# Patient Record
Sex: MALE | Race: WHITE | NOT HISPANIC OR LATINO | Employment: FULL TIME | URBAN - METROPOLITAN AREA
[De-identification: names, ages, dates, MRNs, and addresses within clinical notes are randomized per-mention and may not be internally consistent; named-entity substitution may affect disease eponyms.]

---
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
## 2019-01-07 ENCOUNTER — COMMUNICATION - HEALTHEAST (OUTPATIENT)
Dept: PEDIATRICS | Facility: CLINIC | Age: 19
End: 2019-01-07

## 2019-01-07 DIAGNOSIS — F90.2 ATTENTION DEFICIT HYPERACTIVITY DISORDER (ADHD), COMBINED TYPE: ICD-10-CM

## 2019-01-10 ENCOUNTER — OFFICE VISIT - HEALTHEAST (OUTPATIENT)
Dept: PEDIATRICS | Facility: CLINIC | Age: 19
End: 2019-01-10

## 2019-01-10 DIAGNOSIS — F90.2 ATTENTION DEFICIT HYPERACTIVITY DISORDER (ADHD), COMBINED TYPE: ICD-10-CM

## 2019-02-14 ENCOUNTER — COMMUNICATION - HEALTHEAST (OUTPATIENT)
Dept: PEDIATRICS | Facility: CLINIC | Age: 19
End: 2019-02-14

## 2019-02-14 DIAGNOSIS — F90.2 ATTENTION DEFICIT HYPERACTIVITY DISORDER (ADHD), COMBINED TYPE: ICD-10-CM

## 2019-02-16 ENCOUNTER — COMMUNICATION - HEALTHEAST (OUTPATIENT)
Dept: SCHEDULING | Facility: CLINIC | Age: 19
End: 2019-02-16

## 2019-02-16 ENCOUNTER — COMMUNICATION - HEALTHEAST (OUTPATIENT)
Dept: PEDIATRICS | Facility: CLINIC | Age: 19
End: 2019-02-16

## 2019-02-16 DIAGNOSIS — F90.2 ATTENTION DEFICIT HYPERACTIVITY DISORDER (ADHD), COMBINED TYPE: ICD-10-CM

## 2019-03-11 ENCOUNTER — COMMUNICATION - HEALTHEAST (OUTPATIENT)
Dept: PEDIATRICS | Facility: CLINIC | Age: 19
End: 2019-03-11

## 2019-03-11 DIAGNOSIS — F90.2 ATTENTION DEFICIT HYPERACTIVITY DISORDER (ADHD), COMBINED TYPE: ICD-10-CM

## 2019-03-17 ENCOUNTER — COMMUNICATION - HEALTHEAST (OUTPATIENT)
Dept: PEDIATRICS | Facility: CLINIC | Age: 19
End: 2019-03-17

## 2019-03-17 DIAGNOSIS — F90.2 ATTENTION DEFICIT HYPERACTIVITY DISORDER (ADHD), COMBINED TYPE: ICD-10-CM

## 2019-04-22 ENCOUNTER — COMMUNICATION - HEALTHEAST (OUTPATIENT)
Dept: PEDIATRICS | Facility: CLINIC | Age: 19
End: 2019-04-22

## 2019-04-22 DIAGNOSIS — F90.2 ATTENTION DEFICIT HYPERACTIVITY DISORDER (ADHD), COMBINED TYPE: ICD-10-CM

## 2019-05-17 ENCOUNTER — COMMUNICATION - HEALTHEAST (OUTPATIENT)
Dept: PEDIATRICS | Facility: CLINIC | Age: 19
End: 2019-05-17

## 2019-05-17 DIAGNOSIS — F90.2 ATTENTION DEFICIT HYPERACTIVITY DISORDER (ADHD), COMBINED TYPE: ICD-10-CM

## 2019-06-17 ENCOUNTER — COMMUNICATION - HEALTHEAST (OUTPATIENT)
Dept: PEDIATRICS | Facility: CLINIC | Age: 19
End: 2019-06-17

## 2019-06-17 DIAGNOSIS — F90.2 ATTENTION DEFICIT HYPERACTIVITY DISORDER (ADHD), COMBINED TYPE: ICD-10-CM

## 2019-08-12 ENCOUNTER — COMMUNICATION - HEALTHEAST (OUTPATIENT)
Dept: PEDIATRICS | Facility: CLINIC | Age: 19
End: 2019-08-12

## 2019-08-12 DIAGNOSIS — F90.2 ATTENTION DEFICIT HYPERACTIVITY DISORDER (ADHD), COMBINED TYPE: ICD-10-CM

## 2019-08-16 ENCOUNTER — COMMUNICATION - HEALTHEAST (OUTPATIENT)
Dept: PEDIATRICS | Facility: CLINIC | Age: 19
End: 2019-08-16

## 2019-09-03 ENCOUNTER — COMMUNICATION - HEALTHEAST (OUTPATIENT)
Dept: PEDIATRICS | Facility: CLINIC | Age: 19
End: 2019-09-03

## 2019-09-03 DIAGNOSIS — F90.2 ATTENTION DEFICIT HYPERACTIVITY DISORDER (ADHD), COMBINED TYPE: ICD-10-CM

## 2019-09-10 ENCOUNTER — COMMUNICATION - HEALTHEAST (OUTPATIENT)
Dept: PEDIATRICS | Facility: CLINIC | Age: 19
End: 2019-09-10

## 2019-09-24 ENCOUNTER — COMMUNICATION - HEALTHEAST (OUTPATIENT)
Dept: PEDIATRICS | Facility: CLINIC | Age: 19
End: 2019-09-24

## 2019-09-24 DIAGNOSIS — F90.2 ATTENTION DEFICIT HYPERACTIVITY DISORDER (ADHD), COMBINED TYPE: ICD-10-CM

## 2019-09-26 ENCOUNTER — AMBULATORY - HEALTHEAST (OUTPATIENT)
Dept: PEDIATRICS | Facility: CLINIC | Age: 19
End: 2019-09-26

## 2019-09-26 ENCOUNTER — COMMUNICATION - HEALTHEAST (OUTPATIENT)
Dept: PEDIATRICS | Facility: CLINIC | Age: 19
End: 2019-09-26

## 2019-09-26 DIAGNOSIS — F90.2 ATTENTION DEFICIT HYPERACTIVITY DISORDER (ADHD), COMBINED TYPE: ICD-10-CM

## 2019-09-30 ENCOUNTER — OFFICE VISIT - HEALTHEAST (OUTPATIENT)
Dept: PEDIATRICS | Facility: CLINIC | Age: 19
End: 2019-09-30

## 2019-09-30 ENCOUNTER — COMMUNICATION - HEALTHEAST (OUTPATIENT)
Dept: TELEHEALTH | Facility: CLINIC | Age: 19
End: 2019-09-30

## 2019-09-30 DIAGNOSIS — F90.2 ATTENTION DEFICIT HYPERACTIVITY DISORDER (ADHD), COMBINED TYPE: ICD-10-CM

## 2019-12-25 ENCOUNTER — COMMUNICATION - HEALTHEAST (OUTPATIENT)
Dept: PEDIATRICS | Facility: CLINIC | Age: 19
End: 2019-12-25

## 2019-12-25 DIAGNOSIS — F90.2 ATTENTION DEFICIT HYPERACTIVITY DISORDER (ADHD), COMBINED TYPE: ICD-10-CM

## 2020-03-12 ENCOUNTER — COMMUNICATION - HEALTHEAST (OUTPATIENT)
Dept: PEDIATRICS | Facility: CLINIC | Age: 20
End: 2020-03-12

## 2020-03-12 DIAGNOSIS — F90.2 ATTENTION DEFICIT HYPERACTIVITY DISORDER (ADHD), COMBINED TYPE: ICD-10-CM

## 2020-04-13 ENCOUNTER — OFFICE VISIT - HEALTHEAST (OUTPATIENT)
Dept: PEDIATRICS | Facility: CLINIC | Age: 20
End: 2020-04-13

## 2020-04-13 DIAGNOSIS — F90.2 ATTENTION DEFICIT HYPERACTIVITY DISORDER (ADHD), COMBINED TYPE: ICD-10-CM

## 2020-05-28 ENCOUNTER — COMMUNICATION - HEALTHEAST (OUTPATIENT)
Dept: FAMILY MEDICINE | Facility: CLINIC | Age: 20
End: 2020-05-28

## 2020-05-28 DIAGNOSIS — F90.2 ATTENTION DEFICIT HYPERACTIVITY DISORDER (ADHD), COMBINED TYPE: ICD-10-CM

## 2020-07-07 ENCOUNTER — COMMUNICATION - HEALTHEAST (OUTPATIENT)
Dept: PEDIATRICS | Facility: CLINIC | Age: 20
End: 2020-07-07

## 2020-07-07 DIAGNOSIS — F90.2 ATTENTION DEFICIT HYPERACTIVITY DISORDER (ADHD), COMBINED TYPE: ICD-10-CM

## 2020-07-31 ENCOUNTER — COMMUNICATION - HEALTHEAST (OUTPATIENT)
Dept: PEDIATRICS | Facility: CLINIC | Age: 20
End: 2020-07-31

## 2020-07-31 DIAGNOSIS — F90.2 ATTENTION DEFICIT HYPERACTIVITY DISORDER (ADHD), COMBINED TYPE: ICD-10-CM

## 2020-09-11 ENCOUNTER — COMMUNICATION - HEALTHEAST (OUTPATIENT)
Dept: PEDIATRICS | Facility: CLINIC | Age: 20
End: 2020-09-11

## 2020-09-11 DIAGNOSIS — F90.2 ATTENTION DEFICIT HYPERACTIVITY DISORDER (ADHD), COMBINED TYPE: ICD-10-CM

## 2020-09-15 ENCOUNTER — COMMUNICATION - HEALTHEAST (OUTPATIENT)
Dept: PEDIATRICS | Facility: CLINIC | Age: 20
End: 2020-09-15

## 2020-09-22 ENCOUNTER — COMMUNICATION - HEALTHEAST (OUTPATIENT)
Dept: PEDIATRICS | Facility: CLINIC | Age: 20
End: 2020-09-22

## 2020-09-22 DIAGNOSIS — F90.2 ATTENTION DEFICIT HYPERACTIVITY DISORDER (ADHD), COMBINED TYPE: ICD-10-CM

## 2020-10-09 ENCOUNTER — OFFICE VISIT - HEALTHEAST (OUTPATIENT)
Dept: PEDIATRICS | Facility: CLINIC | Age: 20
End: 2020-10-09

## 2020-10-09 DIAGNOSIS — F90.2 ATTENTION DEFICIT HYPERACTIVITY DISORDER (ADHD), COMBINED TYPE: ICD-10-CM

## 2020-10-09 RX ORDER — GLYCOPYRROLATE 1 MG/1
TABLET ORAL
Status: SHIPPED | COMMUNITY
Start: 2019-10-23 | End: 2021-08-23

## 2020-11-11 ENCOUNTER — COMMUNICATION - HEALTHEAST (OUTPATIENT)
Dept: PEDIATRICS | Facility: CLINIC | Age: 20
End: 2020-11-11

## 2020-11-11 DIAGNOSIS — F90.2 ATTENTION DEFICIT HYPERACTIVITY DISORDER (ADHD), COMBINED TYPE: ICD-10-CM

## 2020-11-12 ENCOUNTER — COMMUNICATION - HEALTHEAST (OUTPATIENT)
Dept: PEDIATRICS | Facility: CLINIC | Age: 20
End: 2020-11-12

## 2020-11-12 DIAGNOSIS — F32.A DEPRESSION, UNSPECIFIED DEPRESSION TYPE: ICD-10-CM

## 2020-12-12 ENCOUNTER — COMMUNICATION - HEALTHEAST (OUTPATIENT)
Dept: PEDIATRICS | Facility: CLINIC | Age: 20
End: 2020-12-12

## 2020-12-12 DIAGNOSIS — F90.2 ATTENTION DEFICIT HYPERACTIVITY DISORDER (ADHD), COMBINED TYPE: ICD-10-CM

## 2021-01-22 ENCOUNTER — COMMUNICATION - HEALTHEAST (OUTPATIENT)
Dept: PEDIATRICS | Facility: CLINIC | Age: 21
End: 2021-01-22

## 2021-01-27 ENCOUNTER — COMMUNICATION - HEALTHEAST (OUTPATIENT)
Dept: PEDIATRICS | Facility: CLINIC | Age: 21
End: 2021-01-27

## 2021-01-27 DIAGNOSIS — F90.2 ATTENTION DEFICIT HYPERACTIVITY DISORDER (ADHD), COMBINED TYPE: ICD-10-CM

## 2021-01-28 RX ORDER — METHYLPHENIDATE HYDROCHLORIDE 10 MG/1
TABLET ORAL
Qty: 90 TABLET | Refills: 0 | Status: SHIPPED | OUTPATIENT
Start: 2021-01-28

## 2021-02-28 ENCOUNTER — COMMUNICATION - HEALTHEAST (OUTPATIENT)
Dept: PEDIATRICS | Facility: CLINIC | Age: 21
End: 2021-02-28

## 2021-02-28 DIAGNOSIS — F90.2 ATTENTION DEFICIT HYPERACTIVITY DISORDER (ADHD), COMBINED TYPE: ICD-10-CM

## 2021-03-05 ENCOUNTER — COMMUNICATION - HEALTHEAST (OUTPATIENT)
Dept: TELEHEALTH | Facility: CLINIC | Age: 21
End: 2021-03-05

## 2021-03-27 ENCOUNTER — COMMUNICATION - HEALTHEAST (OUTPATIENT)
Dept: PEDIATRICS | Facility: CLINIC | Age: 21
End: 2021-03-27

## 2021-03-27 DIAGNOSIS — F90.2 ATTENTION DEFICIT HYPERACTIVITY DISORDER (ADHD), COMBINED TYPE: ICD-10-CM

## 2021-03-30 RX ORDER — METHYLPHENIDATE 20 MG/9H
PATCH TRANSDERMAL
Qty: 60 PATCH | Refills: 0 | Status: SHIPPED | OUTPATIENT
Start: 2021-03-30 | End: 2021-08-04

## 2021-04-30 ENCOUNTER — COMMUNICATION - HEALTHEAST (OUTPATIENT)
Dept: PEDIATRICS | Facility: CLINIC | Age: 21
End: 2021-04-30

## 2021-04-30 DIAGNOSIS — F90.2 ATTENTION DEFICIT HYPERACTIVITY DISORDER (ADHD), COMBINED TYPE: ICD-10-CM

## 2021-05-28 NOTE — TELEPHONE ENCOUNTER
Controlled Substance Refill Request  Medication:   Requested Prescriptions     Pending Prescriptions Disp Refills     methylphenidate HCl (RITALIN) 10 MG tablet [Pharmacy Med Name: Methylphenidate HCl Tablet 10 MG] 60 tablet 0     Sig: Take 1 tablet (10 mg total) by mouth 2 (two) times a day.     methylphenidate (DAYTRANA) 20 mg/9 hr [Pharmacy Med Name: Daytrana Patch 20 MG/9HR]  0     Sig: Place 2 patches on the skin daily. wear patch for 9 hours only each day     Methylphenidate 10mg Date Last Fill: 4/24/19  #60 R-0  Methylphenidate patch Date Last Fill: 4/24/19  #60 R-0    Pharmacy: [facility]   Submit electronically to pharmacy  Controlled Substance Agreement on File:   Encounter-Level CSA Scan Date:    There are no encounter-level csa scan date.       Last office visit: 1/10/2019 Alla Hagan MD Katie Beck RN Triage Nurse Advisor [facility]

## 2021-05-28 NOTE — TELEPHONE ENCOUNTER
Controlled Substance Refill Request  Medication:   Requested Prescriptions     Pending Prescriptions Disp Refills     methylphenidate HCl (RITALIN) 10 MG tablet [Pharmacy Med Name: Methylphenidate HCl Tablet 10 MG] 60 tablet 0     Sig: Take 1 tablet (10 mg total) by mouth 2 (two) times a day.     methylphenidate (DAYTRANA) 20 mg/9 hr [Pharmacy Med Name: Daytrana Patch 20 MG/9HR]  0     Sig: Place 2 patches on the skin daily. wear patch for 9 hours only each day     Date Last Fill: 3/18/19  [facility]acy: [facility]   Submit electronically to pharmacy  Controlled Substance Agreement on File:   Encounter-Level CSA Scan Date:    There are no encounter-level csa scan date.       Last office visit: Last office visit pertaining to requested medication was 1/10/19.

## 2021-05-29 NOTE — TELEPHONE ENCOUNTER
Controlled Substance Refill Request  Medication:   Requested Prescriptions     Pending Prescriptions Disp Refills     methylphenidate (DAYTRANA) 20 mg/9 hr [Pharmacy Med Name: Daytrana Patch 20 MG/9HR]  0     Sig: Place 2 patches on the skin daily. wear patch for 9 hours only each day     Date Last Fill: 5/20/19  Pharmacy: [facility]   Submit electronically to pharmacy    Controlled Substance Agreement on File:   Encounter-Level CSA Scan Date:    There are no encounter-level csa scan date.       Last office visit with primary: 1/10/2019

## 2021-05-31 VITALS — BODY MASS INDEX: 23.09 KG/M2 | WEIGHT: 152.38 LBS | HEIGHT: 68 IN

## 2021-05-31 VITALS — WEIGHT: 164.9 LBS | BODY MASS INDEX: 24.99 KG/M2 | HEIGHT: 68 IN

## 2021-05-31 VITALS — BODY MASS INDEX: 23.96 KG/M2 | HEIGHT: 68 IN | WEIGHT: 158.1 LBS

## 2021-05-31 NOTE — TELEPHONE ENCOUNTER
Central PA team  [phone]  Pool: HE PA MED ([id number])          PA has been initiated.       PA form completed and faxed insurance via Cover My Meds     Key: [id number]     Medication:  methylphenidate (DAYTRANA) 20 mg/9 hr    Insurance:  [employer]        Response will be received via fax and may take up to 5-10 business days depending on plan

## 2021-05-31 NOTE — TELEPHONE ENCOUNTER
Controlled Substance Refill Request  Medication:   Requested Prescriptions     Pending Prescriptions Disp Refills     methylphenidate (DAYTRANA) 20 mg/9 hr [Pharmacy Med Name: Daytrana Patch 20 MG/9HR]  0     Sig: Place 2 patches on the skin daily. wear patch for 9 hours only each day     Date Last Fill: 6/19/19  Pharmacy: [facility]  Submit electronically to pharmacy  Controlled Substance Agreement on File:   Encounter-Level CSA Scan Date:    There are no encounter-level csa scan date.       Last office visit: 1/10/2019 Alla Hagan MD

## 2021-05-31 NOTE — TELEPHONE ENCOUNTER
Fax received from [facility] pharmacy requesting Prior Authorization    Medication Name:   methylphenidate (DAYTRANA) 20 mg/9 hr 60 patch 0 8/14/2019 9/14/2019    Sig: Place 2 patches on the skin daily. wear patch for 9 hours only each day    Sent to pharmacy as: methylphenidate (DAYTRANA) 20 mg/9 hr    Earliest Fill Date: 8/14/2019    E-Prescribing Status: Receipt confirmed by pharmacy (8/14/2019 11:55 AM CDT)          Insurance Plan: [employer]   PBM:    Patient ID Number: [id number]    Please start PA process

## 2021-05-31 NOTE — TELEPHONE ENCOUNTER
Left message for Isabel that her RX was sent to pharmacy and any further refills she needs to have a Med check.

## 2021-06-01 VITALS — WEIGHT: 156.7 LBS | BODY MASS INDEX: 23.75 KG/M2 | HEIGHT: 68 IN

## 2021-06-01 NOTE — PATIENT INSTRUCTIONS - HE
ADHD:     Today your ADHD/ADD seems well controlled.      Medication: Continue 40 mg methylphenidate daily     Possible Side effects: Decreased appetite, difficulties sleeping, hyperactivity as the medicine wears off, headaches, unmasking a tic, depression or rarely psychosis.   Please call if you are experiencing any of these side effects.       Follow-Up: Follow up in 6 months or sooner with any concerns.      Lost prescriptions cannot be replaced.

## 2021-06-01 NOTE — TELEPHONE ENCOUNTER
Mother calling to set up appointment with PCP- 30 min appointment set up for 10/21/19- in college and on break at this time. Hoping to get medication refilled to get them to that appointment.     Pharmacy and allergies verified.     Noemi Palomo RN [facility] Triage/Med Refill 9/11/2019 5:02 PM

## 2021-06-01 NOTE — PROGRESS NOTES
Assessment     1. Attention deficit hyperactivity disorder (ADHD), combined type        Plan:     IEP paperwork filled out for college    Patient Instructions   ADHD:     Today your ADHD/ADD seems well controlled.      Medication:  Continue 40 mg methylphenidate daily     Possible Side effects: Decreased appetite, difficulties sleeping, hyperactivity as the medicine wears off, headaches, unmasking a tic, depression or rarely psychosis.   Please call if you are experiencing any of these side effects.       Follow-Up: Follow up in 6 months or sooner with any concerns.      Lost prescriptions cannot be replaced.            Subjective:      HPI: Isabel Foreman is a 18 y.o. male who presents with mother for medication check. He continues on 40 mg methylphenidate daily. 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 He has been struggling for the past week without his medication. On his medication, he feels he is able to pay attention and complete his work. He struggles with finding motivation to complete his work. He states that he understands the material but struggles putting his work down on paper. He feels that tutoring would be helpful to get him to sit down and do his work. He would rather not turn in assignments that are incomplete. He also struggles with organizing his work. He finds it easier to concentrate when he is fidgeting. He feels that he does better working in groups. If he works in a group, he has to document what type of assistant he received. Working in groups leads to a higher chance of having points docked on assignments. He is doing well this semester.     He exercises regularly. He denies any changes with his eating or sleeping habits. He feels he is sleeping better this year. He falls asleep quickly and stays asleep through the night.     PFSH:  He attends [employer]. He is in the engineering program. He has nice roommates.     No past medical  history on file.  Past Surgical History:   Procedure Laterality Date     NO PAST SURGERIES       Patient has no known allergies.  Outpatient Medications Prior to Visit   Medication Sig Dispense Refill     methylphenidate HCl (RITALIN) 10 MG tablet Take 1 tablet (10 mg total) by mouth daily. at 3:30 PM 30 tablet 0     methylphenidate HCl (RITALIN) 10 MG tablet Take 1 tablet (10 mg total) by mouth daily. at 3:30 PM 30 tablet 0     methylphenidate (DAYTRANA) 20 mg/9 hr PLACE 2 PATCHES ON THE SKIN DAILY. WEAR PATCH FOR 9 HOURS ONLY EACH DAY 12 patch 0     polyethylene glycol (GLYCOLAX) 17 gram/dose powder MIX 1 CAPFUL (17 GRAMS) IN 8 OZ. OF FLUID ONCE DAILY AS NEEDED FOR CONSTIPATION 1581 g 3     HYDROcodone-acetaminophen 5-325 mg per tablet Take 1 tablet by mouth every 4 (four) hours as needed for pain. 10 tablet 0     No facility-administered medications prior to visit.      No family history on file.  Social History     Patient does not qualify to have social determinant information on file (likely too young).   Social History Narrative     Not on file     Patient Active Problem List   Diagnosis     Attention deficit hyperactivity disorder (ADHD)     Functional Murmur       Review of Systems  Remainder of 12 point ROS negative      Objective:     Vitals:    09/30/19 1417   BP: 106/70   Pulse: 64   Weight: 163 lb 6.4 oz (74.1 kg)       Physical Exam:     Alert, no acute distress.   Neck is supple without adenopathy or thyromegaly.  Lungs have good air entry bilaterally, no wheezes or crackles.  No prolongation of expiratory phase.   No tachypnea, retractions, or increased work of breathing.  Cardiac exam regular rate and rhythm, normal S1 and S2.  Abdomen is soft and nontender, bowel sounds are present, no hepatosplenomegaly or mass palpable.  Skin, clear without rash      ADDITIONAL HISTORY SUMMARIZED (2): None.  DECISION TO OBTAIN EXTRA INFORMATION (1): None.   RADIOLOGY TESTS (1): None.  LABS (1): None.  MEDICINE  TESTS (1): None.  INDEPENDENT REVIEW (2 each): None.     The visit lasted a total of 25 minutes face to face with the patient. Over 50% of the time was spent counseling and educating the patient about medication check.    I, Kimberley Lilly, am scribing for and in the presence of, Dr. Hagan.    I, Dr. Hagan, personally performed the services described in this documentation, as scribed by Kimberley Lilly in my presence, and it is both accurate and complete.    Total data points: 0

## 2021-06-01 NOTE — TELEPHONE ENCOUNTER
Medication Request  Medication name:     methylphenidate (DAYTRANA) 20 mg/9 hr 60 patch 0 8/14/2019 9/14/2019    Sig: Place 2 patches on the skin daily. wear patch for 9 hours only each day    Sent to pharmacy as: methylphenidate (DAYTRANA) 20 mg/9 hr    Earliest Fill Date: 8/14/2019    E-Prescribing Status: Receipt confirmed by pharmacy (8/14/2019 11:55 AM CDT)      Pharmacy Name and Location: [address]   Reason for request: The patient's mom would like the above medication for the patient to be filled. No consent to communicate with the patient's mother.     The patient's mom is requesting an expedited refill due to being out of the medication. The patient has a follow up appointment made.     When did you use medication last?:  Unknown  Patient offered appointment:  No  Okay to leave a detailed message: no

## 2021-06-01 NOTE — TELEPHONE ENCOUNTER
Unfortunately he is already 2 months overdue to be seen.  I can bridge him for a short time but not all the way until October 21.

## 2021-06-01 NOTE — TELEPHONE ENCOUNTER
Called and spoke with mom. She stated that she would have to call back once she figured out the patients schedule. She was told that she would need to schedule an appt within the next 2 weeks for a med check in order to fidelia a bridge on the medication.

## 2021-06-01 NOTE — TELEPHONE ENCOUNTER
Who is calling:  Patient's mom, Shilpi  Reason for Call:  Caller was checking the status of this request. Caller stated patient is out and needs this refill sent in as soon as possible. Caller is aware this has to wait for Alla Hagan MD to address tomorrow.  Date of last appointment with primary care: 1/10/19  Okay to leave a detailed message: No  [phone]

## 2021-06-01 NOTE — TELEPHONE ENCOUNTER
Called mother to let her know that the patients prescription is ready, but she will need to come to the clinic to  a hard copy, because out e-scribe isn't working at the moment. Hard copy of the prescription will be ready at the , AFTER mom calls and verifies the day it will be picked up and who will be picking it up. Please notify mother that an ID will need to be present and checked in order to  medication.

## 2021-06-01 NOTE — TELEPHONE ENCOUNTER
Controlled Substance Refill Request  Medication:   Requested Prescriptions     Pending Prescriptions Disp Refills     methylphenidate (DAYTRANA) 20 mg/9 hr [Pharmacy Med Name: DAYTRANA DIS 20MG/9HR]  0     Sig: PLACE 2 PATCHES ON THE SKIN DAILY. WEAR PATCH FOR 9 HOURS ONLY EACH DAY     Date Last Fill: 8/14/19  [facility]: [facility]   Submit electronically to pharmacy  Controlled Substance Agreement on File:   Encounter-Level CSA Scan Date:    There are no encounter-level csa scan date.       Last office visit: Last office visit pertaining to requested medication was 1/10/19.

## 2021-06-01 NOTE — TELEPHONE ENCOUNTER
Please call mom and inform her that this is waiting for Dr. Hagan to do. Please also inform mom that we are UNABLE to electronically send any prescriptions for controlled substances at this time due to a computer update. This means that the prescription can only be printed and picked up at the clinic, NOT sent electronically to the pharmacy.

## 2021-06-01 NOTE — TELEPHONE ENCOUNTER
"Controlled Substance Refill Request    Medication Name:  Ritalin 10 mg; EHR has two documented.  Both 10 mg to be taken at 3:00 pm.  One refilled on 12/6/2018.  One refilled on 10/8/2018  Pharmacy is requesting 10 mg tablets written as two daily  Date Last Fill: 12/6/2018  Quantity: 30  Number of refills: 0  Controlled Substance Agreement on File: Yes: 6/5/2017   Early Request:  No, unable to determine  Plan Last OV:   Last office visit addressing: ADHD  Date: 1/10/2019  Copy/Past OV Follow-up Plan:    \"Patient Instructions   Continue Daytrana patches daily and Ritalin as needed.     Recheck in 6 months.\"    Kira Bonds RN, BAN, Nurse Advisor, [place] 11p-7a    "

## 2021-06-01 NOTE — TELEPHONE ENCOUNTER
Patient Returning Call  Reason for call:  Mom returning call to the clinic.  Information relayed to patient:  Yes as instructed and mom stated we were informed of this prior and we agrees with the plan.  The patient will be picking this up today and will have photo ID for check in.   Patient has additional questions:  No  If YES, what are your questions/concerns:  none  Okay to leave a detailed message?: Yes

## 2021-06-02 VITALS — BODY MASS INDEX: 23.83 KG/M2 | WEIGHT: 157.9 LBS

## 2021-06-03 VITALS
DIASTOLIC BLOOD PRESSURE: 70 MMHG | WEIGHT: 163.4 LBS | SYSTOLIC BLOOD PRESSURE: 106 MMHG | BODY MASS INDEX: 24.66 KG/M2 | HEART RATE: 64 BPM

## 2021-06-04 NOTE — TELEPHONE ENCOUNTER
Controlled Substance Refill Request  Medication:   Requested Prescriptions     Pending Prescriptions Disp Refills     methylphenidate (DAYTRANA) 20 mg/9 hr [Pharmacy Med Name: DAYTRANA DIS 20MG/9HR] 180 patch 0     Sig: 'PLACE 2 PATCHES ON THE SKIN DAILY(WEAR PATCH FOR 9 HOURS ONLY EACH DAY)     Date Last Fill: 9/30/19  Pharmacy: [facility] [address]     Submit electronically to pharmacy  Controlled Substance Agreement on File:   Encounter-Level CSA Scan Date:    There are no encounter-level csa scan date.       Last office visit: 9/30/2019 Alla Hagan MD Leslie White, RN BSBA [facility] Triage/Med Refill 12/26/2019 3:21 PM

## 2021-06-06 NOTE — TELEPHONE ENCOUNTER
Controlled Substance Refill Request  Medication Name:   Requested Prescriptions     Pending Prescriptions Disp Refills     methylphenidate (DAYTRANA) 20 mg/9 hr [Pharmacy Med Name: DAYTRANA DIS 20MG/9HR]  0     Sig: PLACE 2 PATCHES ON THE SKIN DAILY(WEAR PATCH FOR 9 HOURS ONLY EACH DAY)     Date Last Fill: 12/27/19  Requested Pharmacy: [facility]  Submit electronically to pharmacy  Controlled Substance Agreement on file:   Encounter-Level CSA Scan Date:    There are no encounter-level csa scan date.        Last office visit:  9/30/19

## 2021-06-07 NOTE — PROGRESS NOTES
"Isabel Foreman is a 19 y.o. male who is being evaluated via a billable telephone visit.      The patient has been notified of following:     \"This telephone visit will be conducted via a call between you and your physician/provider. We have found that certain health care needs can be provided without the need for a physical exam.  This service lets us provide the care you need with a short phone conversation.  If a prescription is necessary we can send it directly to your pharmacy.  If lab work is needed we can place an order for that and you can then stop by our lab to have the test done at a later time.    Telephone visits are billed at different rates depending on your insurance coverage. During this emergency period, for some insurers they may be billed the same as an in-person visit.  Please reach out to your insurance provider with any questions.    If during the course of the call the physician/provider feels a telephone visit is not appropriate, you will not be charged for this service.\"    Patient has given verbal consent to a Telephone visit? Yes    Patient would not like to receive their AVS.    Isabel Foreman complains of    Chief Complaint   Patient presents with     Medication Management     Patient states that med are working       I have reviewed and updated the patient's Past Medical History, Social History, Family History and Medication List.    ALLERGIES  Patient has no known allergies.    Additional provider notes: The patient's ADHD is well managed with Ritalin 10 mg as needed and two Daytrana patches daily. Negative for headaches and stomachaches. The patient has been sleeping and eating well. He recently started online classes due to COVID and he has been staying on top of his assignments because they are all localized to his email. 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       Assessment/Plan:  1. Attention deficit hyperactivity disorder " (ADHD), combined type  - methylphenidate (DAYTRANA) 20 mg/9 hr; PLACE 2 PATCHES ON THE SKIN DAILY(WEAR PATCH FOR 9 HOURS ONLY EACH DAY)  Dispense: 60 patch; Refill: 0  - methylphenidate HCl (RITALIN) 10 MG tablet; Take 1 tablet (10 mg total) by mouth daily. at 3:30 PM  Dispense: 90 tablet; Refill: 0  ADHD:     Today your ADHD/ADD seems well controlled.      Medication:  Continue 40 mg Daytrana daily and ritalin 10 mg prn in evenings     Possible Side effects: Decreased appetite, difficulties sleeping, hyperactivity as the medicine wears off, headaches, unmasking a tic, depression or rarely psychosis.   Please call if you are experiencing any of these side effects.       Follow-Up: Follow up in 6 months or sooner with any concerns.      Lost prescriptions cannot be replaced.    Phone call duration: 7 minutes  Over 50% of the time was spent counseling and educating the patient about ADHD.    ADDITIONAL HISTORY SUMMARIZED (2): None.  DECISION TO OBTAIN EXTRA INFORMATION (1): None.   RADIOLOGY TESTS (1): None.  LABS (1): None.  MEDICINE TESTS (1): None.  INDEPENDENT REVIEW (2 each): None.       IFrancy, am scribing for and in the presence of, Dr. Hagan.    I, Dr. Hagan, personally performed the services described in this documentation, as scribed by Francy Resendiz in my presence, and it is both accurate and complete.    Total data points: 0

## 2021-06-09 NOTE — TELEPHONE ENCOUNTER
Patient mother states patient is out medication requesting to process as soon as possible .  Controlled Substance Refill Request  Medication Name:   Requested Prescriptions     Pending Prescriptions Disp Refills     methylphenidate (DAYTRANA) 20 mg/9 hr 60 patch 0     Sig: PLACE 2 PATCHES ON THE SKIN DAILY(WEAR PATCH FOR 9 HOURS ONLY EACH DAY)   Date Last Fill: 05/29/20  Is patient out of medication?:  Yes  Patient notified refills processed within 3 business days:  Yes  Requested Pharmacy: [email or web address]   Submit electronically to pharmacy  Controlled Substance Agreement on file:   Encounter-Level CSA Scan Date:    There are no encounter-level csa scan date.        Last office visit:  04/13/20

## 2021-06-10 NOTE — TELEPHONE ENCOUNTER
Controlled Substance Refill Request  Medication Name:   Requested Prescriptions     Pending Prescriptions Disp Refills     DAYTRANA 20 mg/9 hr [Pharmacy Med Name: DAYTRANA DIS 20MG/9HR]  0     Sig: PLACE 2 PATCHES ON THE SKIN DAILY(WEAR PATCH FOR 9 HOURS ONLY EACH DAY)     Date Last Fill: 7/7/20  Requested Pharmacy: [facility]  Submit electronically to pharmacy  Controlled Substance Agreement on file:   Encounter-Level CSA Scan Date:    There are no encounter-level csa scan date.        Last office visit:  4/13/20  Sweta Zuniga RN, MA  [facility]    Triage Nurse Advisor

## 2021-06-11 NOTE — TELEPHONE ENCOUNTER
Prior Authorization Request  Who s requesting:  Pharmacy  Pharmacy Name and Location: [facility]  Medication Name:    Disp  Refills  Start  End     DAYTRANA 20 mg/9 hr  60 patch  0  9/14/2020      Sig: PLACE 2 PATCHES ON THE SKIN DAILY(WEAR PATCH FOR 9 HOURS ONLY EACH DAY)     Sent to pharmacy as: Daytrana 20 mg/9 hr daily patch (methylphenidate)     Earliest Fill Date: 9/14/2020     E-Prescribing Status: Receipt confirmed by pharmacy (9/14/2020 11:09 AM CDT)        Insurance Plan: [email or web address]  Insurance Member ID Number:  [id number]  CoverMyMeds Key: N/A  Informed patient that prior authorizations can take up to 10 business days for response:   NA  Okay to leave a detailed message: Yes

## 2021-06-11 NOTE — TELEPHONE ENCOUNTER
Central PA team  [phone]  Pool: HE PA MED ([id number])          PA has been initiated.       PA form completed and faxed insurance via Cover My Meds     Key:  [id number]     Medication:  Daytrana 20MG/9HR patches    Insurance:  [employer]        Response will be received via fax and may take up to 5-10 business days depending on plan

## 2021-06-11 NOTE — PROGRESS NOTES
"Assessment       1. Attention deficit hyperactivity disorder (ADHD), predominantly inattentive type    2. Sleep disturbance    3. Atopic dermatitis        Plan:     Patient Instructions   Please send vaccination records    Keep up on due dates and write them down beforehand in a planner    Try taking benadryl with melatonin at night   Avoid screen time before bed  If this does not work call    Wear gloves when lifting weights     Call when you need refills     Call if you have any questions or concerns.         Subjective:      Chief Complaint   Patient presents with     ADHD       HPI: Isabel Foreman is a 16 y.o. male who presents for ADHD medication management. He has a history of ADHD and is currently taking 20mg/9hr Daytrana and 10mg Ritalin. He feels liked he is more focused and less hyper with medication. He remembers to take it every day.     He had a \"juan david\" school year but was able to improve his grades at the end of the year. He got a couple A's, some B's and two C's. He had a C, which is actually a D, in college level classes. He was borderline failing one of the classes but the professor was able to help him out. Mom is concerned at the fact he is already \"pulling all nighters\" and is staying up the whole night doing homework. He has difficulty focusing and goes down a \"rabbit hole\" becoming distracted on other things when trying to do homework. He is getting good good grades when he turns his homework in on time but he does not always submit his work on time. He explained that a lot of the assignments he missed were before they were rough drafts and he would rather write the whole paper at once. He has a hard time concentrating for multiple days in a row and would rater do it once.     Insomnia: Since school has been out this week he has difficulty falling asleep and staying asleep. He has tried taking 10mg Melatonin which is not working well for him. He denies being more worried.     ROS:  Denies " "headaches. Denies stomach aches.   All other systems negative.     PFSH:  No other pertinent history.    No past medical history on file.  No past surgical history on file.  Review of patient's allergies indicates no known allergies.  Outpatient Medications Prior to Visit   Medication Sig Dispense Refill     polyethylene glycol (GLYCOLAX) 17 gram/dose powder MIX 1 CAPFUL (17 GRAMS) IN 8 OZ. OF FLUID ONCE DAILY AS NEEDED FOR CONSTIPATION 1581 g 0     DAYTRANA 20 mg/9 hr Place 2 patches on the skin daily. wear patch for 9 hours only each day 60 patch 0     methylphenidate (RITALIN) 10 MG tablet Take 1 tablet (10 mg total) by mouth daily. at 3:30 PM 30 tablet 0     No facility-administered medications prior to visit.      No family history on file.  Social History     Social History Narrative     Patient Active Problem List   Diagnosis     Attention deficit hyperactivity disorder (ADHD)     Functional Murmur         Objective:     Vitals:    06/05/17 1126   BP: 106/68   Pulse: 60   Weight: 164 lb 14.4 oz (74.8 kg)   Height: 5' 8\" (1.727 m)       Physical Exam:   Alert, no acute distress.   HEENT, conjunctivae are clear, TMs are without erythema, pus or fluid. Position and landmarks are normal.  Nose is clear.  Oropharynx is moist and clear, without tonsillar hypertrophy, asymmetry, exudate or lesions.  Neck is supple without adenopathy or thyromegaly.  Lungs have good air entry bilaterally, no wheezes or crackles.  No prolongation of expiratory phase.   No tachypnea, retractions, or increased work of breathing.  Cardiac exam regular rate and rhythm, normal S1 and S2.  Abdomen is soft and nontender, bowel sounds are present, no hepatosplenomegaly or mass palpable.  Skin, eczema on hands  Neuro, moving all extremities equally, normal muscle tone in all 4 extremities, deep tendon reflexes 2+ over 4 at both patellae and ankles.    ADDITIONAL HISTORY SUMMARIZED (2): None.  DECISION TO OBTAIN EXTRA INFORMATION (1): None. "   RADIOLOGY TESTS (1): None.  LABS (1): None.  MEDICINE TESTS (1): None.  INDEPENDENT REVIEW (2 each): None.       The visit lasted a total of 17  minutes face to face with the patient. Over 50% of the time was spent counseling and educating the patient about medication management.    IFrancy, am scribing for and in the presence of, Dr. Hagan.    I, Dr. Hagan, personally performed the services described in this documentation, as scribed by Francy Santana in my presence, and it is both accurate and complete.

## 2021-06-11 NOTE — TELEPHONE ENCOUNTER
Controlled Substance Refill Request  Medication Name:   Requested Prescriptions     Pending Prescriptions Disp Refills     DAYTRANA 20 mg/9 hr [Pharmacy Med Name: DAYTRANA DIS 20MG/9HR]  0     Sig: PLACE 2 PATCHES ON THE SKIN DAILY(WEAR PATCH FOR 9 HOURS ONLY EACH DAY)     Date Last Fill: 8/3/20  [facility]ed [facility]: [email or web address]  Submit electronically to pharmacy  Controlled Substance Agreement on file:   Encounter-Level CSA Scan Date:    There are no encounter-level csa scan date.        Last office visit:  4/13/20

## 2021-06-12 NOTE — PROGRESS NOTES
"Assessment       1. Attention deficit hyperactivity disorder (ADHD), predominantly inattentive type        Plan:     Patient Instructions   Take a picture of your homework when you finish it    Request that he be able to submit assignments electronically if he forgets to bring it for full credit    Try making a check list for assignments with due dates  -separate the assignment into parts with different due dates to bring it together    Sign up for a study skills class     Schedule well check     Call if you have any questions or concerns           Subjective:      Chief Complaint   Patient presents with     Follow-up     med check- Needs help with school- accomodations       HPI: Isabel Foreman is a 16 y.o. male who presents for a medication check. He has a history of ADHD and is currently taking 20mg Daytrana and 10mg Ritalin.    He was seen on 6/5/17 to discuss school difficulty. He is not turning his work in on time but the assignments he does turn in he does well. He has tried several techniques to remember to turn in assignments such as lists and planners. He was taking two college level classes last year which were difficult for him because both of the classes were the same professor and he would assign work due on the same day. Every week he needed to find an article to write a paper about but it would take him 2 hours to find an article because he wanted to find the \"perfect\" article and he would read all of them. Once he was able to find one he could write the paper in 30 minutes and receive an A. He does not want a semester extension as a solution because he will wait to do every assignment until the end of the semester. He wants to work at his own pace and have more one-on-one time for assignments he does not understand.     ROS:  All other systems negative.     PFSH:  No other pertinent history.    No past medical history on file.  No past surgical history on file.  Review of patient's allergies " "indicates no known allergies.  Outpatient Medications Prior to Visit   Medication Sig Dispense Refill     DAYTRANA 20 mg/9 hr Place 2 patches on the skin daily. wear patch for 9 hours only each day 60 patch 0     methylphenidate (RITALIN) 10 MG tablet Take 1 tablet (10 mg total) by mouth daily. at 3:30 PM 30 tablet 0     polyethylene glycol (GLYCOLAX) 17 gram/dose powder MIX 1 CAPFUL (17 GRAMS) IN 8 OZ. OF FLUID ONCE DAILY AS NEEDED FOR CONSTIPATION 1581 g 0     No facility-administered medications prior to visit.      No family history on file.  Social History     Social History Narrative     Patient Active Problem List   Diagnosis     Attention deficit hyperactivity disorder (ADHD)     Functional Murmur         Objective:     Vitals:    08/18/17 0932   BP: 104/64   Pulse: 52   Weight: 158 lb 1.6 oz (71.7 kg)   Height: 5' 8.25\" (1.734 m)       Physical Exam:   Alert, no acute distress.       ADDITIONAL HISTORY SUMMARIZED (2): Reviewed note provided by mom today from school counselor regarding ADHD.  DECISION TO OBTAIN EXTRA INFORMATION (1): None.   RADIOLOGY TESTS (1): None.  LABS (1): None.  MEDICINE TESTS (1): None.  INDEPENDENT REVIEW (2 each): None.     The visit lasted a total of 40 minutes face to face with the patient. Over 50% of the time was spent counseling and educating the patient about ADHD.    IFrancy, am scribing for and in the presence of, Dr. Hagan.    I, Alla Hagan, personally performed the services described in this documentation, as scribed by Francy Santana in my presence, and it is both accurate and complete.    Total data points: 2  "

## 2021-06-13 NOTE — TELEPHONE ENCOUNTER
Referral Request  Type of referral: Psychiatrist    Who s requesting: Patient and his Mom, Shilpi    Why the request: Patient has been seeing Souleymane Hernandez, licensed therapist at [facility] in [place], [place].  Souleymane is recommending that patient see a psychiatrist for he believes patient is also suffering from severe depression and anxiety.  Patient needs a referral.      Have you been seen for this request: No.  Dr. Hagan is treating Isabel for ADHD.  Patient sought counseling on his own.    Does patient have a preference on a group/provider?   Dr. Sandra Chung, [facility]3].  Phone # [phone].    Okay to leave a detailed message?  Yes

## 2021-06-13 NOTE — TELEPHONE ENCOUNTER
"FYI -  Order was faxed to [facility] as requested.    Received notice back from [facility] with the following message  \"Our [facility] only accepts [facility].  We will not be able to schedule this patient at our clinic\"    Specialty  has called and notified Mom about the above message and asked her to follow up with patient psychologist for another recommendation.    [facility] - Specialty   "

## 2021-06-13 NOTE — TELEPHONE ENCOUNTER
Controlled Substance Refill Request  Medication Name:   Requested Prescriptions     Pending Prescriptions Disp Refills     methylphenidate HCl (RITALIN) 10 MG tablet [Pharmacy Med Name: METHYLPHENID 10MG] 90 tablet 0     Sig: TAKE 1 TABLET (10 MG TOTAL) BY MOUTH DAILY. AT 3:30 PM     Date Last Fill: 10/9/20  Requested Pharmacy: [facility]  Submit electronically to pharmacy  Controlled Substance Agreement on file:   Encounter-Level CSA Scan Date:    There are no encounter-level csa scan date.        Last office visit:  10/9/20  Sweta Zuniga RN, MA  [facility]    Triage Nurse Advisor

## 2021-06-13 NOTE — TELEPHONE ENCOUNTER
Medication:   Disp Refills Start End SHERLY   methylphenidate (DAYTRANA) 20 mg/9 hr 60 patch 0 10/9/2020  No   Sig: wear patch for 9 hours only each day   Sent to pharmacy as: Daytrana 20 mg/9 hr daily patch (methylphenidate)       Pharmacy:  [facility], [address]  Last Office Visit:10/9/2020

## 2021-06-14 NOTE — PROGRESS NOTES
Assessment       1. Attention deficit hyperactivity disorder (ADHD), combined type        Plan:     Write due date on top of each assignment and take a picture of each page in case it gets lost.    Ask his teachers if he can finish work up over break.     Begin Concerta.     Return in 1 month for a check in on the new medication.    Subjective:      HPI: Isabel Foreman is a 17 y.o. male who presents for an ADHD medication check. He is struggling to turn in assignments at school. He got a C in calculus because it was difficult but he had a friend who helped him. His other grades are Cs and he has an A in engineering, which he really likes. His mother has tried to make a master list, but he did not do well with it. He tries to prioritize things each day and does not worry about past assignments that he already missed. He often does not know when assignments are due and has to depend on friends to inform him. His mother puts his patches on before he wakes up and take a while to kick in. He takes the patches off at about 4 pm, and he says it lasts for 4 to 6 hours after. If he takes it off later, he has trouble sleeping. He also takes the short acting Ritalin 10 mg occassionally. He is open to trying a new medication. For further information, see CAN.    ROS  He is not acutely ill today. Remainder of 12 point ROS negative    PFSH  His parents are recently .   He is hoping to apply at [facility], [facility], [facility] [facility], and a couple others.  Reviewed as below    History reviewed. No pertinent past medical history.  History reviewed. No pertinent surgical history.  Review of patient's allergies indicates no known allergies.  Outpatient Medications Prior to Visit   Medication Sig Dispense Refill     methylphenidate (DAYTRANA) 20 mg/9 hr Place 2 patches on the skin daily. wear patch for 9 hours only each day 60 patch 0     polyethylene glycol (GLYCOLAX) 17 gram/dose powder MIX 1 CAPFUL (17 GRAMS) IN 8 OZ. OF FLUID  "ONCE DAILY AS NEEDED FOR CONSTIPATION 1581 g 0     methylphenidate HCl (RITALIN) 10 MG tablet Take 1 tablet (10 mg total) by mouth daily. at 3:30 PM 30 tablet 0     No facility-administered medications prior to visit.      History reviewed. No pertinent family history.  Social History     Social History Narrative     Patient Active Problem List   Diagnosis     Attention deficit hyperactivity disorder (ADHD)     Functional Murmur         Objective:     Vitals:    12/18/17 1510   BP: 118/62   Pulse: 80   Weight: 152 lb 6 oz (69.1 kg)   Height: 5' 8\" (1.727 m)       Physical Exam:   Alert, no acute distress.   Neck is supple without adenopathy or thyromegaly.  Lungs have good air entry bilaterally, no wheezes or crackles.  No prolongation of expiratory phase.   No tachypnea, retractions, or increased work of breathing.  Cardiac exam regular rate and rhythm, normal S1 and S2.  Abdomen is soft and nontender, bowel sounds are present, no hepatosplenomegaly or mass palpable.  Skin, clear without rash    ADDITIONAL HISTORY SUMMARIZED (2): None.  DECISION TO OBTAIN EXTRA INFORMATION (1): None.   RADIOLOGY TESTS (1): None.  LABS (1): None.  MEDICINE TESTS (1): None.  INDEPENDENT REVIEW (2 each): None.     The visit lasted a total of 24 minutes face to face with the patient. Over 50% of the time was spent counseling and educating the patient about medication management.    I, Kelly Kayser, am scribing for and in the presence of, Dr. Hagan.    IAlla, personally performed the services described in this documentation, as scribed by Kelly Kayser in my presence, and it is both accurate and complete.    Total Data Points: 0  "

## 2021-06-14 NOTE — TELEPHONE ENCOUNTER
Medication:   Disp Refills Start End SHERLY   methylphenidate HCl (RITALIN) 10 MG tablet 90 tablet 0 12/14/2020  No   Sig: TAKE 1 TABLET (10 MG TOTAL) BY MOUTH DAILY. AT 3:30 PM       Pharmacy:  [facility], [address]  Last Office Visit:  10/9/2020        Side note:  Informed patient of overdue physical and will call back to schedule.

## 2021-06-14 NOTE — TELEPHONE ENCOUNTER
----- Message from Magdiel Siu sent at 1/21/2021  9:02 AM CST -----  Patient is overdue for a physical. Please schedule

## 2021-06-15 NOTE — TELEPHONE ENCOUNTER
Please clarify dose with patient. Last office note says two 20 mg patch daily. Prescription reads one 20 mg patch daily. Dr. Hagan is out of clinic - how is he using this? Also, he needs a med check in April. It looks like Dr. Hagan was recommending transition to family medicine (Dr. Rueda) due to his age.

## 2021-06-15 NOTE — TELEPHONE ENCOUNTER
Controlled Substance Refill Request  Medication Name:   Requested Prescriptions     Pending Prescriptions Disp Refills     DAYTRANA 20 mg/9 hr [Pharmacy Med Name: DAYTRANA DIS 20MG/9HR]  0     Sig: WEAR 1 PATCH FOR 9 HOURS ONLY EACH DAY     Date Last Fill: 11/13/20  Requested Pharmacy: [facility]  Submit electronically to pharmacy  Controlled Substance Agreement on file:   Encounter-Level CSA Scan Date:    There are no encounter-level csa scan date.        Last office visit:  10/9/20  Sweta Zuniga RN, MA  [facility]    Triage Nurse Advisor

## 2021-06-15 NOTE — TELEPHONE ENCOUNTER
Left a detailed message to call back and inform us what dose med he is doing and call to schedule with Dr. Rueda.

## 2021-06-15 NOTE — TELEPHONE ENCOUNTER
Left message to call back for: patient  Information to relay to patient:  Please see message below from Dr. Hunt

## 2021-06-16 NOTE — TELEPHONE ENCOUNTER
Telephone Encounter by Lorraine Adames CMA at 1/9/2019  7:32 AM     Author: Lorraine Adames CMA Service: -- Author Type: Certified Medical Assistant    Filed: 1/9/2019  7:32 AM Encounter Date: 1/7/2019 Status: Signed    : Lorraine Adames CMA (Certified Medical Assistant)       Name from pharmacy: Methylphenidate HCl Tablet 10 MG          Will file in chart as: methylphenidate HCl (RITALIN) 10 MG tablet    Sig: Take 1 tablet (10 mg total) by mouth daily. at 3:30 PM    Disp:  30 tablet (Pharmacy requested: 30 each)    Refills:  0    Start: 1/7/2019    Class: Normal    For: Attention deficit hyperactivity disorder (ADHD), combined type    Requested on: 12/6/2018    Last ordered: 1 month ago by Alla Hagan MD Last refill: 12/6/2018    Rx #: [id number]    Controlled Substances Refill Protocol Failed1/8 2:12 PM   Route all Controlled Substance Requests to Provider    Patient has controlled substance agreement in past 12 months    Visit with PCP or prescribing provider visit in past 12 months    Protocol Details    Name from pharmacy: Daytrana Patch 20 MG/9HR         Will file in chart as: DAYTRANA 20 mg/9 hr    Sig: Place 2 patches on the skin daily. wear patch for 9 hours only each day    Disp:  Not specified (Pharmacy requested: 60 each)    Refills:  0    Start: 1/7/2019    Class: Normal    For: Attention deficit hyperactivity disorder (ADHD), combined type    Requested on: 12/6/2018    Last ordered: 1 month ago by Alla Hagan MD Last refill: 12/6/2018    Rx #: [id number]    Controlled Substances Refill Protocol Failed1/8 2:12 PM   Route all Controlled Substance Requests to Provider    Patient has controlled substance agreement in past 12 months    Visit with PCP or prescribing provider visit in past 12 months    Protocol Details   To be filled at: [facility] [address]

## 2021-06-16 NOTE — TELEPHONE ENCOUNTER
Medication:   Disp Refills Start End SHERLY   methylphenidate (DAYTRANA) 20 mg/9 hr 60 patch 0 3/4/2021 4/4/2021 No   Sig: WEAR 2 PATCH FOR 9 HOURS ONLY EACH DAY   Sent to pharmacy as: Daytrana 20 mg/9 hr daily patch (methylphenidate)       Pharmacy:  [facility]    Last Office Visit:    10/9/20

## 2021-06-16 NOTE — TELEPHONE ENCOUNTER
Telephone Encounter by Hazel Sr at 8/19/2019 10:31 AM     Author: Hazel Sr Service: -- Author Type: --    Filed: 8/19/2019 10:32 AM Encounter Date: 8/16/2019 Status: Signed    : Hazel Sr APPROVED:    Approval start date: 7/16/19  Approval end date:8/16/2020    Pharmacy has been notified of approval and will contact patient when medication is ready for pickup.

## 2021-06-16 NOTE — TELEPHONE ENCOUNTER
Controlled Substance Refill Request  Medication Name:   Requested Prescriptions     Pending Prescriptions Disp Refills     DAYTRANA 20 mg/9 hr [Pharmacy Med Name: DAYTRANA DIS 20MG/9HR]  0     Sig: WEAR 2 PATCH FOR 9 HOURS ONLY EACH DAY     Date Last Fill: 3/4/21  Requested Pharmacy: [facility]  Submit electronically to pharmacy  Controlled Substance Agreement on file:   Encounter-Level CSA Scan Date:    There are no encounter-level csa scan date.        Last office visit:  10/9/20  Sweta Zuniga RN, MA  [facility]    Triage Nurse Advisor

## 2021-06-16 NOTE — TELEPHONE ENCOUNTER
Telephone Encounter by Lorraine Adames CMA at 2/18/2019 12:26 PM     Author: Lorraine Adames CMA Service: -- Author Type: Certified Medical Assistant    Filed: 2/18/2019 12:26 PM Encounter Date: 2/14/2019 Status: Signed    : Lorraine Adames CMA (Certified Medical Assistant)       Name from pharmacy: Daytrana Patch 20 MG/9HR          Will file in chart as: DAYTRANA 20 mg/9 hr    Sig: Place 2 patches on the skin daily. wear patch for 9 hours only each day    Disp:  Not specified (Pharmacy requested: 60 each)    Refills:  0    Start: 2/14/2019    Class: Normal    For: Attention deficit hyperactivity disorder (ADHD), combined type    Requested on: 1/10/2019    Last ordered: 1 month ago by Alal Hagan MD Last refill: 1/10/2019    Rx #: [id number]    Controlled Substances Refill Protocol Failed2/18 8:48 AM   Route all Controlled Substance Requests to Provider    Patient has controlled substance agreement in past 12 months    Visit with PCP or prescribing provider visit in past 12 months    Protocol Details   To be filled at: [facility] [address]

## 2021-06-17 NOTE — TELEPHONE ENCOUNTER
Message left for patient to return call.  If patient calls back, please relay message below. Assist with scheduling an appointment. Multiple attempts to reach patient, no response. Letter mailed.

## 2021-06-17 NOTE — TELEPHONE ENCOUNTER
Patient is due for a 6 month follow up. Per -have patient follow up with  in family medicine due to patient's age.      Arun MCCORMICK,CMA

## 2021-06-17 NOTE — TELEPHONE ENCOUNTER
Controlled Substance Refill Request  Medication Name:   Requested Prescriptions     Pending Prescriptions Disp Refills     DAYTRANA 20 mg/9 hr [Pharmacy Med Name: DAYTRANA DIS 20MG/9HR]  0     Sig: WEAR 2 PATCH FOR 9 HOURS ONLY EACH DAY     Date Last Fill: 3/30/21  Requested Pharmacy: [facility]  Submit electronically to pharmacy  Controlled Substance Agreement on file:   Encounter-Level CSA Scan Date:    There are no encounter-level csa scan date.        Last office visit:  10/9/20  Sweta Zuniga RN, MA  [facility]    Triage Nurse Advisor

## 2021-06-17 NOTE — TELEPHONE ENCOUNTER
Telephone Encounter by Arlene Mclean at 9/17/2020  1:45 PM     Author: Arlene Mclean Service: -- Author Type: --    Filed: 9/17/2020  1:46 PM Encounter Date: 9/15/2020 Status: Signed    : Arlene Mclean APPROVED:    Approval start date: 08/17/2020   Approval end date:  09/17/2021    Pharmacy has been notified of approval and will contact patient when medication is ready for pickup.
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
## 2021-06-19 NOTE — LETTER
Letter by Alla Hagan MD at      Author: Alla Hagan MD Service: -- Author Type: --    Filed:  Encounter Date: 9/30/2019 Status: Signed         [facility] PEDIATRICS  09/30/19    Patient: Isabel Foreman  YOB: 2000  Medical Record Number: [id number]  CSN: [id number]                                                                              Non-opioid Controlled Substance Agreement    I understand that my care provider has prescribed a controlled substance to help manage my condition(s). I am taking this medicine to help me function or work. I know this is strong medicine, and that it can cause serious side effects. Controlled substances can be sedating, addicting and may cause a dependency on the drug. They can affect my ability to drive or think, and cause depression. They need to be taken exactly as prescribed. Combining controlled substances with certain medicines or chemicals (such as cocaine, sedatives and tranquilizers, sleeping pills, meth) can be dangerous or even fatal. Also, if I stop controlled substances suddenly, I may have severe withdrawal symptoms.  If not helpful, I may be asked to stop them.    The risks, benefits, and side effects of these medicine(s) were explained to me. I agree that:    1. I will take part in other treatments as advised by my care team. This may be psychiatry or counseling, physical therapy, behavioral therapy, group treatment or a referral to a pain clinic. I will reduce or stop my medicine when my care team tells me to do so.  2. I will take my medicines as prescribed. I will not change the dose or schedule unless my care team tells me to. There will be no refills if I run out early.  I may be contactedwithout warning and asked to complete a urine drug test or pill count at any time.   3. I will keep all my appointments, and understand this is part of the monitoring of controlled substances. My care team may require an office visit  for EVERY controlled substance refill. If I miss appointments or dont follow instructions, my care team may stop my medicine.  4. I will not ask other providers to prescribe controlled substances, and I will not accept controlled substances from other people. If I need another prescribed controlled substance for a new reason, I will tell my care team within 1 business day.  5. I will use one pharmacy to fill all of my controlled substance prescriptions, and it is up to me to make sure that I do not run out of my medicines on weekends or holidays. If my care team is willing to refill my controlled substance prescription without a visit, I must request refills only during office hours, refills may take up to 3 days to process, and it may take up to 5 to 7 days for my medicine to be mailed and ready at my pharmacy. Prescriptions will not be mailed anywhere except my pharmacy.    6. I am responsible for my prescriptions. If the medicine/prescription is lost or stolen, it will not be replaced. I also agree not to share controlled substance medicines with anyone.          [facility] PEDIATRICS  09/30/19  Patient:  Isabel Foreman  YOB: 2000  Medical Record Number: [id number]  CSN: [id number]    7. I agree to not use ANY illegal or recreational drugs. This includes marijuana, cocaine, bath salts or other drugs. I agree not to use alcohol unless my care team says I may. I agree to give urine samples whenever asked. If I dont give a urine sample, the care team may stop my medicine.    8. If I enroll in the [place] Medical Marijuana program, I will tell my care team. I will also sign an agreement to share my medical records with my care team.    9. I will bring in my list of medicines (or my medicine bottles) each time I come to the clinic.   10. I will tell my care team right away if I become pregnant or have a new medical problem treated outside of my regular clinic.  11. I understand that this  medicine can affect my thinking and judgment. It may be unsafe for me to drive, use machinery and do dangerous tasks. I will not do any of these things until I know how the medicine affects me. If my dose changes, I will wait to see how it affects me. I will contact my care team if I have concerns about medicine side effects.    I understand that if I do not follow any of the conditions above, my prescriptions or treatment may be stopped.      I agree that my provider, clinic care team, and pharmacy may work with any city, state or federal law enforcement agency that investigates the misuse, sale, or other diversion of my controlled medicine. I will allow my provider to discuss my care with or share a copy of this agreement with any other treating provider, pharmacy or emergency room where I receive care. I agree to give up (waive) any right of privacy or confidentiality with respect to these consents.   I have read this agreement and have asked questions about anything I did not understand.    ___________________________________________________________________________  Patient signature - Date/Time  -Isabel Foreman                                      ___________________________________________________________________________  Witness signature                                                                    ___________________________________________________________________________  Provider signature- Alla Hagan MD

## 2021-06-19 NOTE — LETTER
Letter by Alla Hagan MD at      Author: Alla Hagan MD Service: -- Author Type: --    Filed:  Encounter Date: 9/30/2019 Status: Signed         September 30, 2019     Patient: Isabel Foreman   YOB: 2000   Date of Visit: 9/30/2019       To Whom It May Concern:    Isabel Foreman has ADHD/ADD. He was diagnosed using neuropsychology testing. We ask that you provide tutoring, taped lectures, an academic achievement mentor, and allow group work to accomodate his disability.      If you have any questions or concerns, please don't hesitate to call.    Sincerely,        Electronically signed by Alla Hagan MD

## 2021-06-22 NOTE — TELEPHONE ENCOUNTER
Please let patient know that he is due for a medication check.  Once appointment is scheduled I can give him enough medication to make it to his appointment.

## 2021-06-22 NOTE — TELEPHONE ENCOUNTER
Controlled Substance Refill Request  Medication:   Requested Prescriptions     Pending Prescriptions Disp Refills     methylphenidate HCl (RITALIN) 10 MG tablet [Pharmacy Med Name: Methylphenidate HCl Tablet 10 MG] 30 tablet 0     Sig: Take 1 tablet (10 mg total) by mouth daily. at 3:30 PM     DAYTRANA 20 mg/9 hr [Pharmacy Med Name: Daytrana Patch 20 MG/9HR]  0     Sig: Place 2 patches on the skin daily. wear patch for 9 hours only each day     Date Last Fill: 12.6.18  [facility]   Submit electronically to pharmacy  Controlled Substance Agreement on File:   Encounter-Level CSA Scan Date:    There are no encounter-level csa scan date.       Last office visit: Last office visit pertaining to requested medication was 6.25.18.

## 2021-06-23 NOTE — PROGRESS NOTES
Assessment     1. Attention deficit hyperactivity disorder (ADHD), combined type        Plan:       Patient Instructions   Continue Daytrana patches daily and Ritalin as needed.     Recheck in 6 months.          Subjective:      HPI: Isabel Foreman is a 18 y.o. male who presents for medication check. He attends [employer] and lives on campus. He was told by his  that he was on academic probation. He is unsure what his grades are this semester. He plans to retake his calculus class. He continues on 20 mg Daytrana daily and 10 mg Ritalin daily. He uses his patch 1 hour prior to class and removes the patch after he completes his school work. He uses Ritalin for his morning classes and sometimes in the afternoon for schoolwork.      No past medical history on file.  Past Surgical History:   Procedure Laterality Date     NO PAST SURGERIES       Patient has no known allergies.  Outpatient Medications Prior to Visit   Medication Sig Dispense Refill     methylphenidate HCl (RITALIN) 10 MG tablet Take 1 tablet (10 mg total) by mouth daily. at 3:30 PM 30 tablet 0     methylphenidate HCl (RITALIN) 10 MG tablet Take 1 tablet (10 mg total) by mouth daily. at 3:30 PM 30 tablet 0     DAYTRANA 20 mg/9 hr Place 2 patches on the skin daily. wear patch for 9 hours only each day 60 patch 0     methylphenidate HCl (RITALIN) 10 MG tablet Take 1 tablet (10 mg total) by mouth daily. at 3:30 PM 30 tablet 0     HYDROcodone-acetaminophen 5-325 mg per tablet Take 1 tablet by mouth every 4 (four) hours as needed for pain. 10 tablet 0     methylphenidate (DAYTRANA) 20 mg/9 hr Place 2 patches on the skin daily. wear patch for 9 hours only each day 60 patch 0     polyethylene glycol (GLYCOLAX) 17 gram/dose powder MIX 1 CAPFUL (17 GRAMS) IN 8 OZ. OF FLUID ONCE DAILY AS NEEDED FOR CONSTIPATION 1581 g 3     silver sulfADIAZINE (SILVADENE, SSD) 1 % cream Apply to affected area daily 50 g 1     No facility-administered medications  prior to visit.      No family history on file.  Social History     Social History Narrative     Not on file     Patient Active Problem List   Diagnosis     Attention deficit hyperactivity disorder (ADHD)     Functional Murmur       Review of Systems  Remainder of 12 point ROS negative      Objective:     Vitals:    01/10/19 0820   BP: 102/64   Weight: 157 lb 14.4 oz (71.6 kg)       Physical Exam:     Alert, no acute distress.   HEENT, conjunctivae are clear, TMs are without erythema, pus or fluid. Position and landmarks are normal.  Nose is clear.  Oropharynx is moist and clear, without tonsillar hypertrophy, asymmetry, exudate or lesions.  Neck is supple without adenopathy or thyromegaly.  Lungs have good air entry bilaterally, no wheezes or crackles.  No prolongation of expiratory phase.   No tachypnea, retractions, or increased work of breathing.  Cardiac exam regular rate and rhythm, normal S1 and S2.  Abdomen is soft and nontender, bowel sounds are present, no hepatosplenomegaly or mass palpable.  Skin, clear without rash    ADDITIONAL HISTORY SUMMARIZED (2): None.  DECISION TO OBTAIN EXTRA INFORMATION (1): None.   RADIOLOGY TESTS (1): None.  LABS (1): None.  MEDICINE TESTS (1): None.  INDEPENDENT REVIEW (2 each): None.     The visit lasted a total of 13 minutes face to face with the patient. Over 50% of the time was spent counseling and educating the patient about medication check.    I, Kimberley Lilly, am scribing for and in the presence of, Dr. Hagan.    I, Dr. Hagan, personally performed the services described in this documentation, as scribed by Kimberley Lilly in my presence, and it is both accurate and complete.    Total data points: 0

## 2021-06-24 NOTE — TELEPHONE ENCOUNTER
Please contact family to notify patches have been refilled.  Please remind family to contact Dr. Hagan at least a week before next refill to ensure done in timely matter.  Cristian Marr MD

## 2021-06-24 NOTE — TELEPHONE ENCOUNTER
Call from mom      Re: Refill of controlled substance (ADHD med)  - not called in yet to pharmacy      Can on-call provider address this?      I related that unfortunately controlled substances are not addressed on the weekends including ADHD medications.      If need be, can have him seen at local UC or ED for evaluation      Caller understands        Boubacar Rogers RN   Triage and Medication Refills          Reason for Disposition    Reason: professional judgment or information in Reference    Protocols used: NO GUIDELINE [id number]

## 2021-06-24 NOTE — TELEPHONE ENCOUNTER
Name from pharmacy: Methylphenidate HCl Tablet 10 MG          Will file in chart as: methylphenidate HCl (RITALIN) 10 MG tablet    Sig: Take 1 tablet (10 mg total) by mouth 2 (two) times a day.    Disp:  60 tablet (Pharmacy requested: 60 each)    Refills:  0    Start: 2/16/2019    Class: Normal    For: Attention deficit hyperactivity disorder (ADHD), combined type    Requested on: 1/10/2019    Last ordered: 1 month ago by Alla Hagan MD Last refill: 1/10/2019    Rx #: [id number]    Controlled Substances Refill Protocol Failed2/19 2:08 PM   Route all Controlled Substance Requests to Provider    Patient has controlled substance agreement in past 12 months    Visit with PCP or prescribing provider visit in past 12 months    Protocol Details   [facility] [place] [facility] [address]

## 2021-06-24 NOTE — TELEPHONE ENCOUNTER
Controlled Substance Refill Request  Medication:   Requested Prescriptions     Pending Prescriptions Disp Refills     methylphenidate HCl (RITALIN) 10 MG tablet [Pharmacy Med Name: Methylphenidate HCl Tablet 10 MG] 60 tablet 0     Sig: Take 1 tablet (10 mg total) by mouth 2 (two) times a day.     Date Last Fill: 12/6/18  [email or web address]   Submit electronically to pharmacy  Controlled Substance Agreement on File:   Encounter-Level CSA Scan Date:    There are no encounter-level csa scan date.       Last office visit: Last office visit pertaining to requested medication was 1/10/19.

## 2021-06-24 NOTE — TELEPHONE ENCOUNTER
Controlled Substance Refill Request  Medication:   Requested Prescriptions     Pending Prescriptions Disp Refills     methylphenidate (DAYTRANA) 20 mg/9 hr [Pharmacy Med Name: Daytrana Patch 20 MG/9HR]  0     Sig: Place 2 patches on the skin daily. wear patch for 9 hours only each day     Date Last Fill: 1/10/19  [email or web address]   Submit electronically to pharmacy  Controlled Substance Agreement on File:   Encounter-Level CSA Scan Date:    There are no encounter-level csa scan date.       Last office visit: Last office visit pertaining to requested medication was 1/10/19.

## 2021-06-25 NOTE — TELEPHONE ENCOUNTER
Controlled Substance Refill Request  Medication:   Requested Prescriptions     Pending Prescriptions Disp Refills     methylphenidate (DAYTRANA) 20 mg/9 hr [Pharmacy Med Name: Daytrana Patch 20 MG/9HR]  0     Sig: Place 2 patches on the skin daily. wear patch for 9 hours only each day     Date Last Fill: 2/18/19  [facility]    Submit electronically to pharmacy  Controlled Substance Agreement on File:   Encounter-Level CSA Scan Date:    There are no encounter-level csa scan date.       Last office visit: Last office visit pertaining to requested medication was 1/10/19 .

## 2021-06-25 NOTE — TELEPHONE ENCOUNTER
RN cannot approve Refill Request    RN can NOT refill this medication med is not covered by policy/route to provider.       Last office visit: 1/10/2019 Alla Hagan MD Last Physical: Visit date not found Last MTM visit: Visit date not found Last visit same specialty: 1/10/2019 Alla Hagan MD.  Next visit within 3 mo: Visit date not found  Next physical within 3 mo: Visit date not found      [facility] Connection Triage/Med Refill 3/15/2019    Requested Prescriptions   Pending Prescriptions Disp Refills     methylphenidate (DAYTRANA) 20 mg/9 hr  0     Sig: Place 2 patches on the skin daily. wear patch for 9 hours only each day    Controlled Substances Refill Protocol Failed - 3/11/2019 12:37 PM       Failed - Route all Controlled Substance Requests to Provider       Failed - Patient has controlled substance agreement in past 12 months    Encounter-Level CSA Scan Date:    There are no encounter-level csa scan date.              Passed - Visit with PCP or prescribing provider visit in past 12 months     Last office visit with prescriber/PCP: 1/10/2019 Alla Hagan MD OR same dept: 1/10/2019 Alla Hagan MD OR same specialty: 1/10/2019 Alla Hagan MD Last physical: Visit date not found Last MTM visit: Visit date not found    Next visit within 3 mo: Visit date not found  Next physical within 3 mo: Visit date not found  Prescriber OR PCP: Alla Hagan MD  Last diagnosis associated with med order: 1. Attention deficit hyperactivity disorder (ADHD), combined type  - methylphenidate (DAYTRANA) 20 mg/9 hr; Place 2 patches on the skin daily. wear patch for 9 hours only each day; Refill: 0  - methylphenidate HCl (RITALIN) 10 MG tablet [Pharmacy Med Name: Methylphenidate HCl Tablet 10 MG]; Take 1 tablet (10 mg total) by mouth 2 (two) times a day.  Dispense: 60 tablet; Refill: 0               methylphenidate HCl (RITALIN) 10 MG tablet [Pharmacy Med Name: Methylphenidate HCl Tablet 10  MG] 60 tablet 0     Sig: Take 1 tablet (10 mg total) by mouth 2 (two) times a day.    Controlled Substances Refill Protocol Failed - 3/11/2019 12:37 PM       Failed - Route all Controlled Substance Requests to Provider       Failed - Patient has controlled substance agreement in past 12 months    Encounter-Level CSA Scan Date:    There are no encounter-level csa scan date.              Passed - Visit with PCP or prescribing provider visit in past 12 months     Last office visit with prescriber/PCP: 1/10/2019 Alla Hagan MD OR same dept: 1/10/2019 Alla Hagan MD OR same specialty: 1/10/2019 Alla Hagan MD Last physical: Visit date not found Last MTM visit: Visit date not found    Next visit within 3 mo: Visit date not found  Next physical within 3 mo: Visit date not found  Prescriber OR PCP: Alla Hagan MD  Last diagnosis associated with med order: 1. Attention deficit hyperactivity disorder (ADHD), combined type  - methylphenidate (DAYTRANA) 20 mg/9 hr; Place 2 patches on the skin daily. wear patch for 9 hours only each day; Refill: 0  - methylphenidate HCl (RITALIN) 10 MG tablet [Pharmacy Med Name: Methylphenidate HCl Tablet 10 MG]; Take 1 tablet (10 mg total) by mouth 2 (two) times a day.  Dispense: 60 tablet; Refill: 0

## 2021-06-25 NOTE — TELEPHONE ENCOUNTER
Name from pharmacy: Daytrana Patch 20 MG/9HR         Will file in chart as: DAYTRANA 20 mg/9 hr    Sig: Place 2 patches on the skin daily. wear patch for 9 hours only each day    Disp:  Not specified (Pharmacy requested: 60 each)    Refills:  0    Start: 3/17/2019    Class: Normal    For: Attention deficit hyperactivity disorder (ADHD), combined type    Requested on: 2/18/2019    Last ordered: 4 weeks ago by Cristian Marr MD Last refill: 2/18/2019    Rx #: [id number]    Controlled Substances Refill Protocol Failed3/17 1:50 PM   Route all Controlled Substance Requests to Provider    Patient has controlled substance agreement in past 12 months    Visit with PCP or prescribing provider visit in past 12 months    Protocol Details   To be filled at: [facility] [place] - [address]

## 2021-08-06 DIAGNOSIS — F90.2 ATTENTION DEFICIT HYPERACTIVITY DISORDER (ADHD), COMBINED TYPE: ICD-10-CM

## 2021-08-06 RX ORDER — METHYLPHENIDATE 20 MG/9H
2 PATCH TRANSDERMAL DAILY
Qty: 30 PATCH | Refills: 0 | Status: SHIPPED | OUTPATIENT
Start: 2021-08-06 | End: 2021-08-23

## 2021-08-06 NOTE — PROGRESS NOTES
"Isabel Foreman is a 20 y.o. male who is being evaluated via a billable video visit.      The patient has been notified of following:     \"This video visit will be conducted via a call between you and your physician/provider. We have found that certain health care needs can be provided without the need for an in-person physical exam.  This service lets us provide the care you need with a video conversation.  If a prescription is necessary we can send it directly to your pharmacy.  If lab work is needed we can place an order for that and you can then stop by our lab to have the test done at a later time.    Video visits are billed at different rates depending on your insurance coverage. Please reach out to your insurance provider with any questions.    If during the course of the call the physician/provider feels a video visit is not appropriate, you will not be charged for this service.\"    Patient has given verbal consent to a Video visit? Yes  How would you like to obtain your AVS? AVS Preference: Mail a copy.  If dropped by the video visit, the video invitation should be sent to: Text to cell phone: [phone]  Will anyone else be joining your video visit? No        Video Start Time: 2:57 PM    Video-Visit Details  Type of service:  Video Visit    Video End Time (time video stopped): 3:05 PM  Originating Location (pt. Location): Home    Distant Location (provider location):  [facility]     Platform used for Video Visit: Doximity    Assessment     1. Attention deficit hyperactivity disorder (ADHD), combined type      Plan:     Patient Instructions   Dr. Rueda    ADHD:     Today your ADHD/ADD seems well controlled.      Medication:  Continue 40 mg Daytrana daily and ritalin 10 mg prn in evenings     Possible Side effects: Decreased appetite, difficulties sleeping, hyperactivity as the medicine wears off, headaches, unmasking a tic, depression or rarely psychosis. Please call " if you are experiencing any of these side effects.       Follow-Up: Follow up in 6 months or sooner with any concerns.      Lost prescriptions cannot be replaced.    Subjective:      HPI: Isabel Foreman is a 20 y.o. male who presents for ADHD management. The patient continues on two Daytrana 20mg/9hr patches daily as well as Ritalin 10 mg as needed. He applies the first patch around 8-10 AM and it works for his first class. He usually takes the patch off when he finishes his homework and it lasts for another 2-3 hours. Patient has been sleeping and eating well. Patient denies experiencing side effects including headaches and stomachaches.     Last year the patient struggled with distance learning and he got C's and C-'s. This year school is going better now that he is back on campus. 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 Patient says that he struggles with it because it is a difficult subject and because it is almost 2 hours long. His other classes are easy.The patient has 3 roommates. There is 1 roommate that he doesn't get along with because he is abrasive. Next semester he is moving. He might share a house with 8 people. The patient has a girlfriend that he is doing well with.     ROS: All other reviewed systems are negative except for those listed in the HPI.    PSFH: No recent changes in medical, surgical, social, and family history.     No past medical history on file.  Past Surgical History:   Procedure Laterality Date     NO PAST SURGERIES       Patient has no known allergies.  Outpatient Medications Prior to Visit   Medication Sig Dispense Refill     glycopyrrolate (ROBINUL) 1 mg tablet        methylphenidate HCl (RITALIN) 10 MG tablet Take 10 mg by mouth 2 (two) times a day.       DAYTRANA 20 mg/9 hr PLACE 2 PATCHES ON THE SKIN DAILY(WEAR PATCH FOR 9 HOURS ONLY EACH DAY) 60 patch 0     No facility-administered medications prior to visit.      No family history on  file.  Social History     Social History Narrative     Not on file     Patient Active Problem List   Diagnosis     Attention deficit hyperactivity disorder (ADHD)     Functional Murmur     Objective:   There were no vitals filed for this visit.    Physical Exam:   GENERAL: Healthy, alert and no distress  EYES: Eyes grossly normal to inspection. No discharge or erythema, or obvious scleral/conjunctival abnormalities.  RESP: No audible wheeze, cough, or visible cyanosis.  No visible retractions or increased work of breathing.    NEURO: Cranial nerves grossly intact. Mentation and speech appropriate for age.  PSYCH: Mentation appears normal, affect normal/bright, judgement and insight intact, normal speech and appearance well-groomed    ADDITIONAL HISTORY SUMMARIZED (2): None.  DECISION TO OBTAIN EXTRA INFORMATION (1): None.   RADIOLOGY TESTS (1): None.  LABS (1): None.  MEDICINE TESTS (1): None.  INDEPENDENT REVIEW (2 each): None.       The visit lasted a total of 8 minutes face to face with the patient. Over 50% of the time was spent counseling and educating the patient about ADHD.    IFrancy, am scribing for and in the presence of, Dr. Hagan.    I, Dr. Hagan, personally performed the services described in this documentation, as scribed by Francy Resendiz in my presence, and it is both accurate and complete.    Total data points: 0

## 2021-08-23 ENCOUNTER — OFFICE VISIT (OUTPATIENT)
Dept: FAMILY MEDICINE | Facility: CLINIC | Age: 21
End: 2021-08-23
Payer: COMMERCIAL

## 2021-08-23 VITALS
WEIGHT: 170.7 LBS | DIASTOLIC BLOOD PRESSURE: 68 MMHG | OXYGEN SATURATION: 98 % | BODY MASS INDEX: 25.77 KG/M2 | SYSTOLIC BLOOD PRESSURE: 118 MMHG | HEART RATE: 62 BPM

## 2021-08-23 DIAGNOSIS — F90.9 ATTENTION DEFICIT HYPERACTIVITY DISORDER (ADHD), UNSPECIFIED ADHD TYPE: ICD-10-CM

## 2021-08-23 DIAGNOSIS — F90.2 ATTENTION DEFICIT HYPERACTIVITY DISORDER (ADHD), COMBINED TYPE: ICD-10-CM

## 2021-08-23 DIAGNOSIS — F32.A DEPRESSION, UNSPECIFIED DEPRESSION TYPE: ICD-10-CM

## 2021-08-23 PROCEDURE — 99214 OFFICE O/P EST MOD 30 MIN: CPT | Performed by: FAMILY MEDICINE

## 2021-08-23 RX ORDER — SERTRALINE HYDROCHLORIDE 25 MG/1
25 TABLET, FILM COATED ORAL 3 TIMES DAILY
COMMUNITY
Start: 2021-08-09

## 2021-08-23 RX ORDER — METHYLPHENIDATE 20 MG/9H
1 PATCH TRANSDERMAL DAILY
Qty: 60 PATCH | Refills: 0 | Status: SHIPPED | OUTPATIENT
Start: 2021-10-24 | End: 2022-12-07

## 2021-08-23 RX ORDER — METHYLPHENIDATE 20 MG/9H
2 PATCH TRANSDERMAL DAILY
Qty: 60 PATCH | Refills: 0 | Status: SHIPPED | OUTPATIENT
Start: 2021-08-23 | End: 2021-09-20

## 2021-08-23 NOTE — LETTER
[facility]  08/23/21  Patient: Isabel Foreman  YOB: 2000  Medical Record Number: [id number]                                                                                  Non-Opioid Controlled Substance Agreement          (methylphenidate (DAYTRANA) 20 MG/9HR patch)    This is an agreement between you and your provider regarding safe and appropriate use of controlled substances prescribed by your care team. Controlled substances are?medicines that can cause physical and mental dependence (abuse).     There are strict laws about having and using these medicines. We here at [facility] are  committed to working with you in your efforts to get better. To support you in this work, we'll help you schedule regular office appointments for medicine refills. If we must cancel or change your appointment for any reason, we'll make sure you have enough medicine to last until your next appointment.     As a Provider, I will:     Listen carefully to your concerns while treating you with respect.     Recommend a treatment plan that I believe is in your best interest and may involve therapies other than medicine.      Talk with you often about the possible benefits and the risk of harm of any medicine that we prescribe for you.    Assess the safety of this medicine and check how well it works.      Provide a plan on how to taper (discontinue or go off) using this medicine if the decision is made to stop its use.      ::  As a Patient, I understand controlled substances:       Are prescribed by my care provider to help me function or work and manage my condition(s).?    Are strong medicines and can cause serious side effects.       Need to be taken exactly as prescribed.?Combining controlled substances with certain medicines or chemicals (such as illegal drugs, alcohol, sedatives, sleeping pills, and benzodiazepines) can be dangerous or even fatal.? If I stop taking my medicines  suddenly, I may have severe withdrawal symptoms.     The risks, benefits, and side effects of these medicine(s) were explained to me. I agree that:    1. I will take part in other treatments as advised by my care team. This may be psychiatry or counseling, physical therapy, behavioral therapy, group treatment or a referral to specialist.    2. I will keep all my appointments and understand this is part of the monitoring of controlled substances.?My care team may require an office visit for EVERY controlled substance refill. If I miss appointments or don t follow instructions, my care team may stop my medicine    3. I will take my medicines as prescribed. I will not change the dose or schedule unless my care team tells me to. There will be no refills if I run out early.      4. I may be asked to come to the clinic and complete a urine drug test or complete a pill count. If I don t give a urine sample or participate in a pill count, the care team may stop my medicine.    5. I will only receive controlled substance prescriptions from this clinic. If I am treated by another provider, I will tell them that I am taking controlled substances and that I have a treatment agreement with this provider. I will inform my [facility] care team within one business day if I am given a prescription for any controlled substance by another healthcare provider. My [facility] care team can contact other providers and pharmacists about my use of any medicines.    6. It is up to me to make sure that I don't run out of my medicines on weekends or holidays.?If my care team is willing to refill my prescription without a visit, I must request refills only during office hours. Refills may take up to 3 business days to process. I will use one pharmacy to fill all my controlled substance prescriptions. I will notify the clinic about any changes to my insurance or medicine availability.    7. I am responsible for my prescriptions. If  the medicine/prescription is lost, stolen or destroyed, it will not be replaced.?I also agree not to share controlled substance medicines with anyone.     8. I am aware I should not use any illegal or recreational drugs. I agree not to drink alcohol unless my care team says I can.     9. If I enroll in the [place] Medical Cannabis program, I will tell my care team before my next refill.    10. I will tell my care team right away if I become pregnant, have a new medical problem treated outside of my regular clinic, or have a change in my medicines.     11. I understand that this medicine can affect my thinking, judgment and reaction time.? Alcohol and drugs affect the brain and body, which can affect the safety of my driving. Being under the influence of alcohol or drugs can affect my decision-making, behaviors, personal safety and the safety of others. Driving while impaired (DWI) can occur if a person is driving, operating or in physical control of a car, motorcycle, boat, snowmobile, ATV, motorbike, off-road vehicle or any other motor vehicle ([place] Statute 169A.20). I understand the risk if I choose to drive or operate any vehicle or machinery.    I understand that if I do not follow any of the conditions above, my prescriptions or treatment may be stopped or changed.   I agree that my provider, clinic care team and pharmacy may work with any city, state or federal law enforcement agency that investigates the misuse, sale or other diversion of my controlled medicine. I will allow my provider to discuss my care with, or share a copy of, this agreement with any other treating provider, pharmacy or emergency room where I receive care.     I have read this agreement and have asked questions about anything I did not understand.    ________________________________________________________  Patient Signature - Isabel Foreman     ___________________                   Date      ________________________________________________________  Provider Signature - Brayden W. Stoyke, MD       ___________________                   Date     ________________________________________________________  Witness Signature (required if provider not present while patient signing)          ___________________                   Date

## 2021-08-23 NOTE — PROGRESS NOTES
ASSESSMENT:  (F90.9) Attention deficit hyperactivity disorder (ADHD)  Comment: Longstanding history of ADHD appears to be relatively stable.  Plan: methylphenidate (DAYTRANA) 20 MG/9HR patch,         methylphenidate (DAYTRANA) 20 MG/9HR patch        More     (F32.9) Depression  Comment: More recent diagnosis, followed by psychiatry  Plan:        PLAN:  1.  I renewed the patient's transdermal methylphenidate for a total of 2 months.  2.  Controlled substance agreement signed  3.  Patient is also followed regularly by psychiatry.  4.  3-month follow-up, if the patient is stable on his ADHD then I could go to every 6 months.    No orders of the defined types were placed in this encounter.    Medications Discontinued During This Encounter   Medication Reason     glycopyrrolate (ROBINUL) 1 mg tablet Therapy completed     methylphenidate (DAYTRANA) 20 MG/9HR patch Reorder       No follow-ups on file.    CHIEF COMPLAINT:  chief complaint    SUBJECTIVE:  Isabel is a 20 year old male patient comes in to \[facility]\"" care.    He has had a longstanding history of ADHD going back to about first grade or so, he has been on Ritalin for quite a number of years he is on a combination of Ritalin patches that he takes daily but he also has 10 mg Ritalin pill that he uses for breakthrough every once in a while maybe 3-4 times a month the more if he is in school.    Patient also has a history of depression he is followed by a psychiatrist who has prescribed sertraline and this has been helpful.    He is up-to-date on preventive maintenance issues though he has not gotten the Covid vaccine and declines at this time.    REVIEW OF SYSTEMS:      All other systems are negative.    PFS:  Immunization History   Administered Date(s) Administered     Comvax (HIB/HepB) 2000, 01/21/2000, 02/21/2000, 10/16/2000     DTAP (<7y) 2000, 02/21/2000, 04/28/2000, 07/13/2001, 07/31/2001, 08/22/2005     DTaP, Unspecified 2000, 02/21/2000,  04/28/2000     Dtap, 5 Pertussis Antigens (DAPTACEL) 07/13/2001, 07/31/2001, 09/22/2005     Flu-nasal, Unspecified 10/18/2011     R0w4-17 Novel Flu P-free 11/05/2009     M5t3-40 Novel Flu- Nasal 12/17/2009     Hep B, Peds or Adolescent 2000     HepA, Unspecified 07/28/2014     HepA-ped 2 Dose 07/28/2014, 08/14/2015     HepB, Unspecified 2000     Influenza (H1N1) 11/05/2009     Influenza (IIV3) PF 10/18/2012, 10/31/2014, 11/21/2016     Influenza Intranasal Vaccine 11/15/2007, 12/12/2007, 11/20/2008, 11/18/2010, 10/18/2011     Influenza Intranasal Vaccine 4 valent 11/07/2013, 10/22/2015     Influenza Vaccine IM > 6 months Valent IIV4 11/21/2016, 09/30/2017, 10/23/2018     Influenza Vaccine, 6+MO IM (QUADRIVALENT W/PRESERVATIVES) 10/31/2014, 09/30/2019     MMR 10/30/2001, 09/22/2005     Meningococcal (Menactra ) 07/28/2014     Meningococcal Mcv4 Conjugate,unspecified  07/28/2014     Pneumococcal (PCV 7) 11/28/2000, 10/30/2001, 01/29/2003     Poliovirus, inactivated (IPV) 1999, 02/21/2000, 10/16/2000, 09/22/2005     Tdap (Adacel,Boostrix) 06/24/2011, 06/24/2018     Varicella 11/28/2000, 11/15/2007     Social History     Socioeconomic History     Marital status: Single     Spouse name: Not on file     Number of children: 0     Years of education: Not on file     Highest education level: Not on file   Occupational History     Occupation: Factory work   Tobacco Use     Smoking status: Never Smoker     Smokeless tobacco: Never Used   Vaping Use     Vaping Use: Some days   Substance and Sexual Activity     Alcohol use: Yes     Alcohol/week: 1.0 standard drinks     Types: 1 Standard drinks or equivalent per week     Drug use: Never     Sexual activity: Yes     Partners: Female   Other Topics Concern     Not on file   Social History Narrative    Diet-regular        Exercise-work is physical        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, mechanical enginnering     Social Determinants of Health     Financial Resource Strain:       Difficulty of Paying Living Expenses:    Food Insecurity:      Worried About Running Out of Food in the Last Year:      Ran Out of Food in the Last Year:    Transportation Needs:      Lack of Transportation (Medical):      Lack of Transportation (Non-Medical):    Physical Activity:      Days of Exercise per Week:      Minutes of Exercise per Session:    Stress:      Feeling of Stress :    Social Connections:      Frequency of Communication with Friends and Family:      Frequency of Social Gatherings with Friends and Family:      Attends Temple Services:      Active Member of Clubs or Organizations:      Attends Club or Organization Meetings:      Marital Status:    Intimate Partner Violence:      Fear of Current or Ex-Partner:      Emotionally Abused:      Physically Abused:      Sexually Abused:      History reviewed. No pertinent past medical history.  Family History   Problem Relation Age of Onset     Hypertension Mother      Attention Deficit Disorder Father         Adderal     Attention Deficit Disorder Sister        MEDICATIONS:  Current Outpatient Medications   Medication Sig Dispense Refill     methylphenidate (DAYTRANA) 20 MG/9HR patch Place 2 patches onto the skin daily wear patch for 9 hours only each day.  One of Two. 60 patch 0     [START ON 10/24/2021] methylphenidate (DAYTRANA) 20 MG/9HR patch Place 1 patch onto the skin daily wear TWO patches for 9 hours only each day.  Two of Two 60 patch 0     methylphenidate HCl (RITALIN) 10 MG tablet [METHYLPHENIDATE HCL (RITALIN) 10 MG TABLET] TAKE 1 TABLET (10 MG TOTAL) BY MOUTH DAILY. AT 3:30 PM 90 tablet 0     sertraline (ZOLOFT) 25 MG tablet Take 25 mg by mouth 3 times daily         TOBACCO USE:  History   Smoking Status     Never Smoker   Smokeless Tobacco     Never Used       VITALS:  Vitals:    08/23/21 1103   BP: 118/68   Pulse: 62   SpO2: 98%   Weight: 77.4 kg (170 lb 11.2 oz)     Wt Readings from Last 3 Encounters:   08/23/21 77.4 kg (170 lb 11.2  oz)   09/30/19 74.1 kg (163 lb 6.4 oz) (62 %, Z= 0.30)*   01/10/19 71.6 kg (157 lb 14.4 oz) (57 %, Z= 0.18)*     * Growth percentiles are based on [facility] (Boys, 2-20 Years) data.       PHYSICAL EXAM:  Constitutional:   Reveals a male who overall looks healthy.  Vitals: per nursing notes.  Eyes:  EOMs full, PERRL.  Musculoskeletal: No peripheral swelling.  Neuro:  Alert and oriented. Cranial nerves, motor, sensory exams are intact.  No gross focal deficits.  Psychiatric:  Memory intact, mood appropriate.    QUALITY MEASURES:      DATA REVIEWED:

## 2021-10-26 DIAGNOSIS — F90.9 ATTENTION DEFICIT HYPERACTIVITY DISORDER (ADHD), UNSPECIFIED ADHD TYPE: ICD-10-CM

## 2021-10-28 NOTE — TELEPHONE ENCOUNTER
Routing refill request to provider for review/approval because:  Drug not on the [facility] refill protocol     Last Written Prescription Date:  9/20/21  Last Fill Quantity: 60 patch,  # refills: 0   Last office visit provider:  8/23/21     Requested Prescriptions   Pending Prescriptions Disp Refills     DAYTRANA 20 MG/9HR patch [Pharmacy Med Name: DAYTRANA DIS 20MG/9HR]  0     Sig: PLACE 2 PATCHES ONTO THE SKIN DAILY WEAR PATCH FOR 9 HOURS ONLY EACH DAY. ONE OF TWO.       There is no refill protocol information for this order          Adri Pena RN 10/28/21 9:42 AM

## 2021-10-29 RX ORDER — METHYLPHENIDATE 20 MG/9H
2 PATCH TRANSDERMAL DAILY
Qty: 60 PATCH | Refills: 0 | Status: SHIPPED | OUTPATIENT
Start: 2021-10-29 | End: 2022-01-24

## 2021-12-03 DIAGNOSIS — F90.9 ATTENTION DEFICIT HYPERACTIVITY DISORDER (ADHD), UNSPECIFIED ADHD TYPE: ICD-10-CM

## 2021-12-06 NOTE — TELEPHONE ENCOUNTER
Routing refill request to provider for review/approval because:  Controlled substance request  PCP information not populated in chart.    Last Written Prescription Date:  10/29/21  Last Fill Quantity: 60,  # refills: 0   Last office visit provider:  8/23/21     Requested Prescriptions   Pending Prescriptions Disp Refills     DAYTRANA 20 MG/9HR patch [Pharmacy Med Name: DAYTRANA DIS 20MG/9HR]  0     Sig: PLACE 2 PATCHES ONTO THE SKIN DAILY WEAR PATCH FOR 9 HOURS ONLY EACH DAY.       There is no refill protocol information for this order          Rodriguez Carlos RN 12/06/21 9:51 AM

## 2021-12-07 RX ORDER — METHYLPHENIDATE 20 MG/9H
PATCH TRANSDERMAL
Refills: 0 | OUTPATIENT
Start: 2021-12-07

## 2022-01-19 DIAGNOSIS — F90.9 ATTENTION DEFICIT HYPERACTIVITY DISORDER (ADHD), UNSPECIFIED ADHD TYPE: ICD-10-CM

## 2022-01-19 NOTE — LETTER
Detail Level: Zone Letter by Alla Hagan MD at      Author: Alla Hagan MD Service: -- Author Type: --    Filed:  Encounter Date: 4/30/2021 Status: (Other)       Isabel Dunnbianka  [address]  [place] [address]             May 5, 2021         Dear Isabel Foreman,    Unfortunately, we were not able to reach you by phone. We received a refill request from your pharmacy. You are due for a follow-up. Due to your age you will be unable to follow-up with Dr. Hagan. Please call us at [phone] to schedule a follow-up with a Family Medicine Physician. Dr. Hagan recommends Dr. Rueda.     Please let us know if you have any questions, or concerns.     Sincerely,        Electronically signed by Alla Hagan MD

## 2022-01-23 NOTE — TELEPHONE ENCOUNTER
Routing refill request to provider for review/approval because:  Drug not on the [facility] refill protocol - controlled substance     Last Written Prescription Date:  10/29/2021  Last Fill Quantity: 60,  # refills: 0   Last office visit provider:  8/23/2021 Dr. Rueda     Requested Prescriptions   Pending Prescriptions Disp Refills     DAYTRANA 20 MG/9HR patch [Pharmacy Med Name: DAYTRANA DIS 20MG/9HR]  0     Sig: PLACE 2 PATCHES ONTO THE SKIN DAILY WEAR PATCH FOR 9 HOURS ONLY EACH DAY.       There is no refill protocol information for this order          Rafia Rousseau RN 01/22/22 10:29 PM

## 2022-01-24 RX ORDER — METHYLPHENIDATE 20 MG/9H
PATCH TRANSDERMAL
Qty: 60 PATCH | Refills: 0 | Status: SHIPPED | OUTPATIENT
Start: 2022-01-24

## 2022-02-07 DIAGNOSIS — F90.9 ATTENTION DEFICIT HYPERACTIVITY DISORDER (ADHD), UNSPECIFIED ADHD TYPE: ICD-10-CM

## 2022-02-09 NOTE — TELEPHONE ENCOUNTER
Routing refill request to provider for review/approval because:  Controlled substance. Early refill request.     Last Written Prescription Date:  1/24/2022  Last Fill Quantity: 60,  # refills: 0   Last office visit provider:  8/23/2021     Requested Prescriptions   Pending Prescriptions Disp Refills     DAYTRANA 20 MG/9HR patch [Pharmacy Med Name: DAYTRANA DIS 20MG/9HR]  0     Sig: PLACE 2 PATCHES ONTO THE SKIN DAILY WEAR PATCH FOR 9 HOURS ONLY EACH DAY.       There is no refill protocol information for this order          Zoe Parra RN 02/09/22 10:45 AM

## 2022-02-11 RX ORDER — METHYLPHENIDATE 20 MG/9H
PATCH TRANSDERMAL
Refills: 0 | OUTPATIENT
Start: 2022-02-11

## 2022-02-17 NOTE — TELEPHONE ENCOUNTER
ATTEMPT #2  LMTCB  Please assist patient to schedule an office visit. He is due for a visit. They will be no more medications dispense until then.

## 2022-04-26 DIAGNOSIS — F90.2 ATTENTION DEFICIT HYPERACTIVITY DISORDER (ADHD), COMBINED TYPE: ICD-10-CM

## 2022-04-29 NOTE — TELEPHONE ENCOUNTER
Routing refill request to provider for review/approval because:  Controlled substance request    Last Written Prescription Date:  1/28/21  Last Fill Quantity: 90,  # refills: 0   Last office visit provider:  8/23/21     Requested Prescriptions   Pending Prescriptions Disp Refills     methylphenidate (RITALIN) 10 MG tablet [Pharmacy Med Name: METHYLPHENID 10MG] 90 tablet 0     Sig: TAKE 1 TABLET (10 MG TOTAL) BY MOUTH DAILY AT 3:30 PM       There is no refill protocol information for this order          Rodriguez Carlos RN 04/29/22 11:09 AM

## 2022-05-02 RX ORDER — METHYLPHENIDATE HYDROCHLORIDE 10 MG/1
TABLET ORAL
Qty: 90 TABLET | Refills: 0 | OUTPATIENT
Start: 2022-05-02

## 2022-05-09 DIAGNOSIS — F90.2 ATTENTION DEFICIT HYPERACTIVITY DISORDER (ADHD), COMBINED TYPE: ICD-10-CM

## 2022-05-11 RX ORDER — METHYLPHENIDATE HYDROCHLORIDE 10 MG/1
TABLET ORAL
Qty: 90 TABLET | Refills: 0 | OUTPATIENT
Start: 2022-05-11

## 2022-05-11 NOTE — TELEPHONE ENCOUNTER
Routing refill request to provider for review/approval because:  Drug not on the [facility] refill protocol     Last Written Prescription Date:  1/28/21  Last Fill Quantity: 90,  # refills: 0   Last office visit provider:  8/23/21     Requested Prescriptions   Pending Prescriptions Disp Refills     methylphenidate (RITALIN) 10 MG tablet [Pharmacy Med Name: METHYLPHENID 10MG] 90 tablet 0     Sig: TAKE 1 TABLET (10 MG TOTAL) BY MOUTH DAILY AT 3:30 PM       There is no refill protocol information for this order          Jeannine Carrillo, RN 05/11/22 4:52 PM

## 2022-05-13 ENCOUNTER — TELEPHONE (OUTPATIENT)
Dept: FAMILY MEDICINE | Facility: CLINIC | Age: 22
End: 2022-05-13
Payer: COMMERCIAL

## 2022-05-13 NOTE — TELEPHONE ENCOUNTER
LMTCB  Please assist patient  Schedule an office visit for medication check and refills. Dr Rueda hasn't seen him since last August.

## 2022-05-13 NOTE — TELEPHONE ENCOUNTER
LMTCB.          Patient need to scheduled an office visit in order to get more refills. Dr Rueda hasn't seen him since August.

## 2022-08-20 ENCOUNTER — OFFICE VISIT (OUTPATIENT)
Dept: URGENT CARE | Facility: URGENT CARE | Age: 22
End: 2022-08-20
Payer: COMMERCIAL

## 2022-08-20 VITALS
DIASTOLIC BLOOD PRESSURE: 66 MMHG | SYSTOLIC BLOOD PRESSURE: 102 MMHG | TEMPERATURE: 97.8 F | BODY MASS INDEX: 30.34 KG/M2 | HEART RATE: 68 BPM | RESPIRATION RATE: 16 BRPM | WEIGHT: 201 LBS

## 2022-08-20 DIAGNOSIS — M62.830 SPASM OF BACK MUSCLES: Primary | ICD-10-CM

## 2022-08-20 PROCEDURE — 99213 OFFICE O/P EST LOW 20 MIN: CPT | Performed by: FAMILY MEDICINE

## 2022-08-20 RX ORDER — METHOCARBAMOL 500 MG/1
500 TABLET, FILM COATED ORAL 4 TIMES DAILY PRN
Qty: 30 TABLET | Refills: 0 | Status: SHIPPED | OUTPATIENT
Start: 2022-08-20

## 2022-08-20 NOTE — LETTER
August 20, 2022      Isabel Foreman  [address] [address]        To Whom It May Concern:    Isabel Foreman  was seen on 8/20.  Please excuse him from work 8/20 due to back pain.        Sincerely,        Carlos Amaya MD

## 2022-08-20 NOTE — PROGRESS NOTES
SUBJECTIVE:  Chief Complaint   Patient presents with     Back Pain     Yesterday, from mid to lower back, tense and seizing sensation, painful with movement, tried ibuprofen, krzysztof back, icy hot     Isabel BORA Foreman is a 21 year old male who presents with a chief complaint of mid back pain.    Rolled out of bed and started to develop back spasm/pain.  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    Has minor back discomfort but attributes this to work.  Has been taking ibuprofen 400 mg and using heating pad to back.    No past medical history on file.  Current Outpatient Medications   Medication Sig Dispense Refill     sertraline (ZOLOFT) 25 MG tablet Take 25 mg by mouth 3 times daily       DAYTRANA 20 MG/9HR patch PLACE 2 PATCHES ONTO THE SKIN DAILY WEAR PATCH FOR 9 HOURS ONLY EACH DAY. (Patient not taking: Reported on 8/20/2022) 60 patch 0     methylphenidate (DAYTRANA) 20 MG/9HR patch Place 1 patch onto the skin daily wear TWO patches for 9 hours only each day.  Two of Two 60 patch 0     methylphenidate HCl (RITALIN) 10 MG tablet [METHYLPHENIDATE HCL (RITALIN) 10 MG TABLET] TAKE 1 TABLET (10 MG TOTAL) BY MOUTH DAILY. AT 3:30 PM (Patient not taking: Reported on 8/20/2022) 90 tablet 0     Social History     Tobacco Use     Smoking status: Never Smoker     Smokeless tobacco: Never Used   Substance Use Topics     Alcohol use: Yes     Alcohol/week: 1.0 standard drink     Types: 1 Standard drinks or equivalent per week       ROS:  Review of systems negative except as stated above.    EXAM:   /66 (BP Location: Right arm, Patient Position: Sitting, Cuff Size: Adult Large)   Pulse 68   Temp 97.8  F (36.6  C) (Tympanic)   Resp 16   Wt 91.2 kg (201 lb)   BMI 30.34 kg/m    M/S Exam:back tenderness to palpation on paraspinal muscles mid thoracic area L>R, no tenderness along spine, decreased ROM  GENERAL  APPEARANCE: healthy, alert and no distress  EXTREMITIES: peripheral pulses normal  SKIN: no suspicious lesions or rashes  PSYCH:alert, affect bright    X-RAY was not done.    ASSESSMENT/PLAN:  (M62.830) Spasm of back muscles  (primary encounter diagnosis)  Plan: methocarbamol (ROBAXIN) 500 MG tablet            Reassurance given, reviewed symptomatic treatment with tylenol, ibuprofen, heat and rest.  RX robaxin given for treatment for muscle spasm    Work excuse note given  Follow up with primary provider if no improvement of symptoms in 1 week    Carlos Amaya MD  August 20, 2022 10:30 AM

## 2022-08-20 NOTE — PATIENT INSTRUCTIONS
Okay to take ibuprofen 200 mg - 4 tablets (800 mg) every 8 hours as needed.  Okay to take tylenol 500 mg - 2 tablets (1000 mg) every 6-8 hours as needed, do not exceed 3000 mg in 24 hours.  Okay to take muscle relaxant - robaxin up to 4 times a day as needed, this will cause drowsiness    Heat to area of discomfort

## 2022-12-07 ENCOUNTER — ANCILLARY PROCEDURE (OUTPATIENT)
Dept: GENERAL RADIOLOGY | Facility: CLINIC | Age: 22
End: 2022-12-07
Attending: PHYSICIAN ASSISTANT
Payer: COMMERCIAL

## 2022-12-07 ENCOUNTER — OFFICE VISIT (OUTPATIENT)
Dept: URGENT CARE | Facility: URGENT CARE | Age: 22
End: 2022-12-07

## 2022-12-07 VITALS
HEART RATE: 62 BPM | DIASTOLIC BLOOD PRESSURE: 76 MMHG | SYSTOLIC BLOOD PRESSURE: 122 MMHG | TEMPERATURE: 97.5 F | OXYGEN SATURATION: 98 %

## 2022-12-07 DIAGNOSIS — S09.90XA CLOSED HEAD INJURY, INITIAL ENCOUNTER: ICD-10-CM

## 2022-12-07 DIAGNOSIS — M54.2 NECK PAIN: ICD-10-CM

## 2022-12-07 DIAGNOSIS — S06.0X0A CONCUSSION WITHOUT LOSS OF CONSCIOUSNESS, INITIAL ENCOUNTER: Primary | ICD-10-CM

## 2022-12-07 PROCEDURE — 99214 OFFICE O/P EST MOD 30 MIN: CPT | Performed by: PHYSICIAN ASSISTANT

## 2022-12-07 PROCEDURE — 72040 X-RAY EXAM NECK SPINE 2-3 VW: CPT | Mod: TC | Performed by: RADIOLOGY

## 2022-12-07 RX ORDER — SERTRALINE HYDROCHLORIDE 100 MG/1
TABLET, FILM COATED ORAL
COMMUNITY
Start: 2022-08-17

## 2022-12-07 NOTE — LETTER
[facility] [place]  [address]  [address]  [place] [address]  Phone: [phone]  Fax: [phone]    December 7, 2022        Isabel Foreman  [address]  [place] [address]          To whom it may concern:    RE: Isabel Foreman    Patient was seen and treated today at our clinic. Please excuse from work 12/7/2022 - 12/10/2022.     Please contact me for questions or concerns.      Sincerely,        Nancy Collado PA-C
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
## 2023-10-17 ENCOUNTER — HOSPITAL ENCOUNTER (EMERGENCY)
Facility: CLINIC | Age: 23
Discharge: HOME | End: 2023-10-17
Attending: STUDENT IN AN ORGANIZED HEALTH CARE EDUCATION/TRAINING PROGRAM | Admitting: STUDENT IN AN ORGANIZED HEALTH CARE EDUCATION/TRAINING PROGRAM
Payer: COMMERCIAL

## 2023-10-17 ENCOUNTER — APPOINTMENT (OUTPATIENT)
Dept: CT IMAGING | Facility: CLINIC | Age: 23
End: 2023-10-17
Attending: EMERGENCY MEDICINE
Payer: COMMERCIAL

## 2023-10-17 VITALS
DIASTOLIC BLOOD PRESSURE: 81 MMHG | HEIGHT: 70 IN | RESPIRATION RATE: 16 BRPM | OXYGEN SATURATION: 100 % | HEART RATE: 76 BPM | BODY MASS INDEX: 30.78 KG/M2 | TEMPERATURE: 97.8 F | SYSTOLIC BLOOD PRESSURE: 137 MMHG | WEIGHT: 215 LBS

## 2023-10-17 DIAGNOSIS — R11.2 NAUSEA AND VOMITING, UNSPECIFIED VOMITING TYPE: ICD-10-CM

## 2023-10-17 DIAGNOSIS — G44.209 ACUTE NON INTRACTABLE TENSION-TYPE HEADACHE: Primary | ICD-10-CM

## 2023-10-17 LAB
ANION GAP SERPL CALCULATED.3IONS-SCNC: 11 MMOL/L (ref 7–15)
BASO+EOS+MONOS # BLD AUTO: ABNORMAL 10*3/UL
BASO+EOS+MONOS NFR BLD AUTO: ABNORMAL %
BASOPHILS # BLD AUTO: 0 10E3/UL (ref 0–0.2)
BASOPHILS NFR BLD AUTO: 0 %
BUN SERPL-MCNC: 14.4 MG/DL (ref 6–20)
CALCIUM SERPL-MCNC: 9.9 MG/DL (ref 8.6–10)
CHLORIDE SERPL-SCNC: 103 MMOL/L (ref 98–107)
CREAT SERPL-MCNC: 1.05 MG/DL (ref 0.67–1.17)
DEPRECATED HCO3 PLAS-SCNC: 27 MMOL/L (ref 22–29)
EGFRCR SERPLBLD CKD-EPI 2021: >90 ML/MIN/1.73M2
EOSINOPHIL # BLD AUTO: 0.1 10E3/UL (ref 0–0.7)
EOSINOPHIL NFR BLD AUTO: 1 %
ERYTHROCYTE [DISTWIDTH] IN BLOOD BY AUTOMATED COUNT: 11.8 % (ref 10–15)
FLUAV RNA SPEC QL NAA+PROBE: NEGATIVE
FLUBV RNA RESP QL NAA+PROBE: NEGATIVE
GLUCOSE SERPL-MCNC: 100 MG/DL (ref 70–99)
HCT VFR BLD AUTO: 41.9 % (ref 40–53)
HGB BLD-MCNC: 14.6 G/DL (ref 13.3–17.7)
IMM GRANULOCYTES # BLD: 0.1 10E3/UL
IMM GRANULOCYTES NFR BLD: 1 %
INR PPP: 0.98 (ref 0.85–1.15)
LYMPHOCYTES # BLD AUTO: 1.9 10E3/UL (ref 0.8–5.3)
LYMPHOCYTES NFR BLD AUTO: 16 %
MCH RBC QN AUTO: 30.7 PG (ref 26.5–33)
MCHC RBC AUTO-ENTMCNC: 34.8 G/DL (ref 31.5–36.5)
MCV RBC AUTO: 88 FL (ref 78–100)
MONOCYTES # BLD AUTO: 0.5 10E3/UL (ref 0–1.3)
MONOCYTES NFR BLD AUTO: 4 %
NEUTROPHILS # BLD AUTO: 9.2 10E3/UL (ref 1.6–8.3)
NEUTROPHILS NFR BLD AUTO: 78 %
NRBC # BLD AUTO: 0 10E3/UL
NRBC BLD AUTO-RTO: 0 /100
PLATELET # BLD AUTO: 247 10E3/UL (ref 150–450)
POTASSIUM SERPL-SCNC: 3.8 MMOL/L (ref 3.4–5.3)
RBC # BLD AUTO: 4.76 10E6/UL (ref 4.4–5.9)
RSV RNA SPEC NAA+PROBE: NEGATIVE
SARS-COV-2 RNA RESP QL NAA+PROBE: NEGATIVE
SODIUM SERPL-SCNC: 141 MMOL/L (ref 135–145)
WBC # BLD AUTO: 11.9 10E3/UL (ref 4–11)

## 2023-10-17 PROCEDURE — 85025 COMPLETE CBC W/AUTO DIFF WBC: CPT | Performed by: EMERGENCY MEDICINE

## 2023-10-17 PROCEDURE — 250N000011 HC RX IP 250 OP 636: Performed by: STUDENT IN AN ORGANIZED HEALTH CARE EDUCATION/TRAINING PROGRAM

## 2023-10-17 PROCEDURE — 70450 CT HEAD/BRAIN W/O DYE: CPT | Mod: XU

## 2023-10-17 PROCEDURE — 250N000009 HC RX 250: Performed by: STUDENT IN AN ORGANIZED HEALTH CARE EDUCATION/TRAINING PROGRAM

## 2023-10-17 PROCEDURE — 96361 HYDRATE IV INFUSION ADD-ON: CPT

## 2023-10-17 PROCEDURE — 96375 TX/PRO/DX INJ NEW DRUG ADDON: CPT | Mod: 59

## 2023-10-17 PROCEDURE — 250N000011 HC RX IP 250 OP 636: Mod: JZ | Performed by: STUDENT IN AN ORGANIZED HEALTH CARE EDUCATION/TRAINING PROGRAM

## 2023-10-17 PROCEDURE — 70496 CT ANGIOGRAPHY HEAD: CPT

## 2023-10-17 PROCEDURE — 80048 BASIC METABOLIC PNL TOTAL CA: CPT | Performed by: EMERGENCY MEDICINE

## 2023-10-17 PROCEDURE — 258N000003 HC RX IP 258 OP 636: Performed by: EMERGENCY MEDICINE

## 2023-10-17 PROCEDURE — 99285 EMERGENCY DEPT VISIT HI MDM: CPT | Mod: 25

## 2023-10-17 PROCEDURE — 87637 SARSCOV2&INF A&B&RSV AMP PRB: CPT | Performed by: EMERGENCY MEDICINE

## 2023-10-17 PROCEDURE — 36415 COLL VENOUS BLD VENIPUNCTURE: CPT | Performed by: EMERGENCY MEDICINE

## 2023-10-17 PROCEDURE — 85610 PROTHROMBIN TIME: CPT | Performed by: EMERGENCY MEDICINE

## 2023-10-17 PROCEDURE — 96374 THER/PROPH/DIAG INJ IV PUSH: CPT | Mod: 59

## 2023-10-17 PROCEDURE — 250N000013 HC RX MED GY IP 250 OP 250 PS 637: Performed by: EMERGENCY MEDICINE

## 2023-10-17 PROCEDURE — 70498 CT ANGIOGRAPHY NECK: CPT

## 2023-10-17 RX ORDER — DROPERIDOL 2.5 MG/ML
1.25 INJECTION, SOLUTION INTRAMUSCULAR; INTRAVENOUS ONCE
Status: COMPLETED | OUTPATIENT
Start: 2023-10-17 | End: 2023-10-17

## 2023-10-17 RX ORDER — KETOROLAC TROMETHAMINE 15 MG/ML
15 INJECTION, SOLUTION INTRAMUSCULAR; INTRAVENOUS ONCE
Status: COMPLETED | OUTPATIENT
Start: 2023-10-17 | End: 2023-10-17

## 2023-10-17 RX ORDER — IOPAMIDOL 755 MG/ML
500 INJECTION, SOLUTION INTRAVASCULAR ONCE
Status: COMPLETED | OUTPATIENT
Start: 2023-10-17 | End: 2023-10-17

## 2023-10-17 RX ORDER — ACETAMINOPHEN 325 MG/1
975 TABLET ORAL ONCE
Status: COMPLETED | OUTPATIENT
Start: 2023-10-17 | End: 2023-10-17

## 2023-10-17 RX ADMIN — DROPERIDOL 1.25 MG: 2.5 INJECTION, SOLUTION INTRAMUSCULAR; INTRAVENOUS at 17:21

## 2023-10-17 RX ADMIN — SODIUM CHLORIDE 100 ML: 9 INJECTION, SOLUTION INTRAVENOUS at 16:35

## 2023-10-17 RX ADMIN — KETOROLAC TROMETHAMINE 15 MG: 15 INJECTION, SOLUTION INTRAMUSCULAR; INTRAVENOUS at 17:22

## 2023-10-17 RX ADMIN — SODIUM CHLORIDE 1000 ML: 9 INJECTION, SOLUTION INTRAVENOUS at 16:28

## 2023-10-17 RX ADMIN — ACETAMINOPHEN 975 MG: 325 TABLET, FILM COATED ORAL at 16:30

## 2023-10-17 RX ADMIN — IOPAMIDOL 67 ML: 755 INJECTION, SOLUTION INTRAVENOUS at 16:35

## 2023-10-17 ASSESSMENT — ACTIVITIES OF DAILY LIVING (ADL): ADLS_ACUITY_SCORE: 35

## 2023-10-17 NOTE — ED PROVIDER NOTES
"History   Chief Complaint:  Headache       HPI:  Isabel Foreman is a very pleasant 23 year old male presenting with headache that began around 1400 when the patient was at work. The patient reports urinating at the time and suddenly the pain came radiating across the frontal region of his head. The pain was at an 8 out of 10 for the first two hours. Within a minute of the pain onset he began feeling nauseous and is experiencing vomiting. He has vomited 4-5 times. He subsequently reports that he was experiencing a runny nose and tearing of the eyes during the onset of the headache. He went to Urgent Care after calling his girlfriend's mother who is a nurse. Urgent Care gave him some antinausea medications and then recommended that he should come here. His headache has gotten a little better as the patient currently rates his pain at a 4.5 out of 10 in severity. His girlfriend reports that the patient was mumbling and slurring his speech and very diaphoretic. He reports feeling near syncopal. He is feeling a little shaky, with some neck pain and shortness of breath that he believes is all related to throwing up several times. He denies any vision changes or trouble chewing.     Independent Historian:   The patient's girlfriend aided in the history noted above.    Review of External Notes:   None.    Medications:    Lamisil  Robaxin  Ritalin  Zoloft    Past Medical History:    GERD  ADHD  Depression   Heart murmur    Physical Exam   Patient Vitals for the past 24 hrs:   BP Temp Temp src Pulse Resp SpO2 Height Weight   10/17/23 1630 128/74 -- -- 55 16 100 % -- --   10/17/23 1551 (!) 127/93 97.8  F (36.6  C) Oral 58 18 100 % 1.765 m (5' 9.5\") 97.5 kg (215 lb)      Physical Exam  Vitals and nursing note reviewed.   Constitutional:       General: He is not in acute distress.     Appearance: Normal appearance. He is obese. He is not ill-appearing or diaphoretic.   Eyes:      Extraocular Movements: Extraocular movements " intact.      Pupils: Pupils are equal, round, and reactive to light.   Cardiovascular:      Rate and Rhythm: Normal rate and regular rhythm.   Pulmonary:      Effort: Pulmonary effort is normal.   Musculoskeletal:      Cervical back: Neck supple. No rigidity.   Skin:     General: Skin is warm and dry.   Neurological:      General: No focal deficit present.      Mental Status: He is alert and oriented to person, place, and time.      Sensory: No sensory deficit.      Motor: No weakness.            Emergency Department Course   ECG  None performed    Imaging:  CTA Head Neck with Contrast   Final Result   IMPRESSION:    HEAD CT:   1.  Normal head CT.      HEAD CTA:    1.  Normal CTA [place].      NECK CTA:   1.  Normal neck CTA.      CT Head w/o Contrast   Final Result   IMPRESSION:    HEAD CT:   1.  Normal head CT.      HEAD CTA:    1.  Normal CTA [place].      NECK CTA:   1.  Normal neck CTA.         Report per radiology    Laboratory:  Labs Ordered and Resulted from Time of ED Arrival to Time of ED Departure   BASIC METABOLIC PANEL - Abnormal       Result Value    Sodium 141      Potassium 3.8      Chloride 103      Carbon Dioxide (CO2) 27      Anion Gap 11      Urea Nitrogen 14.4      Creatinine 1.05      GFR Estimate >90      Calcium 9.9      Glucose 100 (*)    CBC WITH PLATELETS AND DIFFERENTIAL - Abnormal    WBC Count 11.9 (*)     RBC Count 4.76      Hemoglobin 14.6      Hematocrit 41.9      MCV 88      MCH 30.7      MCHC 34.8      RDW 11.8      Platelet Count 247      % Neutrophils 78      % Lymphocytes 16      % Monocytes 4      Mids % (Monos, Eos, Basos)        % Eosinophils 1      % Basophils 0      % Immature Granulocytes 1      NRBCs per 100 WBC 0      Absolute Neutrophils 9.2 (*)     Absolute Lymphocytes 1.9      Absolute Monocytes 0.5      Mids Abs (Monos, Eos, Basos)        Absolute Eosinophils 0.1      Absolute Basophils 0.0      Absolute Immature Granulocytes 0.1      Absolute NRBCs  0.0     INR - Normal    INR 0.98     INFLUENZA A/B, RSV, & SARS-COV2 PCR - Normal    Influenza A PCR Negative      Influenza B PCR Negative      RSV PCR Negative      SARS CoV2 PCR Negative          Procedures   None performed       Emergency Department Course & Assessments:       Interventions:  Medications   sodium chloride 0.9 % bag 500 mL for CT scan flush use (100 mLs As instructed $Given 10/17/23 1635)   sodium chloride 0.9% BOLUS 1,000 mL (0 mLs Intravenous Stopped 10/17/23 1824)   acetaminophen (TYLENOL) tablet 975 mg (975 mg Oral $Given 10/17/23 1630)   iopamidol (ISOVUE-370) solution 500 mL (67 mLs Intravenous $Given 10/17/23 1635)   ketorolac (TORADOL) injection 15 mg (15 mg Intravenous $Given 10/17/23 1722)   droPERidol (INAPSINE) injection 1.25 mg (1.25 mg Intravenous $Given 10/17/23 1721)      Assessments:  1706 I obtained history and examined the patient as noted above.   1720 I rechecked and updated the patient on his negative CT results.  1816 I rechecked and updated the patient. His headache has improved and he feels comfortable with discharge.      Independent Interpretation (X-rays, CTs, rhythm strip):  I independently interpreted the patient's non-contrast head CT; reassuring against acute intracranial hemorrhage.    Consultations/Discussion of Management or Tests:  None    Social Determinants of Health affecting care:   None.      Disposition:  The patient was discharged to home.     Impression & Plan   CMS Diagnoses: None       Medical Decision Making:  ED Course as of 10/17/23 1832   Tue Oct 17, 2023   1715 Patient presenting with sudden onset severe headache, different from prior headaches.  Headache is bilateral, frontal.  Patient did note some lacrimation around the time of the headache onset.  He also developed nausea and vomiting.  Considered differential including subarachnoid hemorrhage, aneurysm, tension headache, atypical migraine, new onset cluster headache, among others.  Low  suspicion for giant cell arteritis given absence of temporal pain, vision changes or jaw claudication.        1718 Work-up with CT/CTA was reassuring against intracranial hemorrhage, subarachnoid hemorrhage, aneurysm.  Patient's headache had already improved upon my assessment.  Will provide migraine cocktail for additional symptomatic relief.  Anticipate discharge with primary care follow-up.   1817 I reassessed the patient.  Headache is resolved at this time.  He feels comfortable with discharge. Findings were discussed.   Additional verbal instructions were provided.   I discussed specific warning signs and instructed the patient to return to the emergency department if there are any concerns.  Understanding of instructions was voiced, questions were answered and the patient was discharged.   Vital signs reviewed prior to discharge.   All nursing notes reviewed.        Critical Care time was 0 minutes for this patient excluding procedures.       Diagnosis:    ICD-10-CM    1. Acute non intractable tension-type headache  G44.209       2. Nausea and vomiting, unspecified vomiting type  R11.2          Discharge Medications:  New Prescriptions    No medications on file       Scribe Disclosure:  I, Evens Marshall, am serving as a scribe at 4:41 PM on 10/17/2023 to document services personally performed by Gordon Ramon MD based on my observations and the provider's statements to me.      Gordon Ramon MD  10/17/23 1863

## 2023-10-17 NOTE — ED TRIAGE NOTES
Pt presents for evaluation of a headache. While voiding at work, pt had a sudden onset of a headache. Associated nausea and emesis. Went to PN , given ODT Zofran and was sent for further evaluation. Headache is frontal and also notes pain in the back of the neck. Took an otc migraine medicine around 1415, but had an episode of emesis afterwards. Denies vision changes or sensitivity to light or sound.

## 2023-10-17 NOTE — ED NOTES
"Tele-PIT/Intake Evaluation      Video-Visit Details    Type of service:  Video Visit    Video Start Time (time video started): 4:14 PM  Video End Time (time video stopped): 4:18 PM   Originating Location (pt. Location): [facility]  Distant Location (provider location):  [facility]  Mode of Communication:  Video Conference via [employer]  Patient verbally consented to [facility] televisit.    History:  Sudden onset HA with sore neck that began 2 hours ago.  He denies trauma.  Denies any weakness or numbness.    Exam:    Patient Vitals for the past 24 hrs:   BP Temp Temp src Pulse Resp SpO2 Height Weight   10/17/23 1551 (!) 127/93 97.8  F (36.6  C) Oral 58 18 100 % 1.765 m (5' 9.5\") 97.5 kg (215 lb)   Constitutional: Alert, attentive, GCS 15   Eyes: EOM are normal, anicteric, conjugate gaze  CV: distal extremities warm, well perfused  Chest: Non-labored breathing on RA  Neurological: Alert, attentive, moving all extremities equally.   Skin: Skin is warm and dry.      Appropriate interventions for symptom management were initiated if applicable.  Appropriate diagnostic tests were initiated if indicated.    Important information for subsequent clinician:  23-year-old male without significant past medical history presenting for thunderclap headache.  We will plan for CT imaging.  Does not have any infectious symptoms.  Denies trauma.    I briefly evaluated the patient and developed an initial plan of care. I discussed this plan and explained that this brief interaction does not constitute a full evaluation. Patient/family understands that they should wait to be fully evaluated and discuss any test results with another clinician prior to leaving the hospital.    Carlitos Robles MD  [employer]  4:18 PM 10/17/23        Carlitos Robles MD  10/17/23 1618    "

## 2023-10-17 NOTE — DISCHARGE INSTRUCTIONS
Thank you for allowing us to evaluate you today.  Follow up with primary care clinician  in 1 week as needed for reevaluation..  For recurrent headache, use acetaminophen (Tylenol) and ibuprofen.  Please read the guidance provided with your discharge instructions.  Immediately return to the emergency department with any concerns.